# Patient Record
Sex: FEMALE | Race: WHITE | Employment: FULL TIME | URBAN - METROPOLITAN AREA
[De-identification: names, ages, dates, MRNs, and addresses within clinical notes are randomized per-mention and may not be internally consistent; named-entity substitution may affect disease eponyms.]

---
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
## 2019-05-06 ENCOUNTER — HOSPITAL ENCOUNTER (EMERGENCY)
Facility: HOSPITAL | Age: 19
Discharge: HOME | End: 2019-05-06
Attending: EMERGENCY MEDICINE
Payer: COMMERCIAL

## 2019-05-06 VITALS
WEIGHT: 147 LBS | HEART RATE: 72 BPM | RESPIRATION RATE: 16 BRPM | TEMPERATURE: 98 F | BODY MASS INDEX: 27.05 KG/M2 | SYSTOLIC BLOOD PRESSURE: 106 MMHG | DIASTOLIC BLOOD PRESSURE: 73 MMHG | HEIGHT: 62 IN | OXYGEN SATURATION: 100 %

## 2019-05-06 DIAGNOSIS — H66.90 ACUTE OTITIS MEDIA, UNSPECIFIED OTITIS MEDIA TYPE: Primary | ICD-10-CM

## 2019-05-06 PROCEDURE — 99283 EMERGENCY DEPT VISIT LOW MDM: CPT

## 2019-05-06 RX ORDER — IBUPROFEN 600 MG/1
600 TABLET ORAL ONCE
Status: COMPLETED | OUTPATIENT
Start: 2019-05-06 | End: 2019-05-06

## 2019-05-06 RX ORDER — AMOXICILLIN AND CLAVULANATE POTASSIUM 875; 125 MG/1; MG/1
1 TABLET, FILM COATED ORAL 2 TIMES DAILY
Qty: 20 TABLET | Refills: 0 | Status: SHIPPED | OUTPATIENT
Start: 2019-05-06 | End: 2019-05-16

## 2019-05-06 NOTE — ED PROVIDER NOTES
Patient Seen in: [facility] Emergency Department    History   Patient presents with:  Ear Problem Pain (neurosensory)  Headache (neurologic)    Stated Complaint:  ear pain    HPI    11-year-old female presents with right-sided ear pain.   She reports bilaterally. Sclerae anicteric. Conjunctivae show no pallor. Oropharynx clear, mucous membranes moist   Neck: supple, no rigidity. No palpable lymphadenopathy. Skin: no rashes or nodules    ED Course   Labs Reviewed - No data to display             MDM

## 2019-10-10 ENCOUNTER — OFFICE VISIT (OUTPATIENT)
Dept: FAMILY MEDICINE CLINIC | Facility: CLINIC | Age: 19
End: 2019-10-10
Payer: COMMERCIAL

## 2019-10-10 ENCOUNTER — LAB ENCOUNTER (OUTPATIENT)
Dept: LAB | Age: 19
End: 2019-10-10
Attending: FAMILY MEDICINE
Payer: COMMERCIAL

## 2019-10-10 VITALS
SYSTOLIC BLOOD PRESSURE: 102 MMHG | HEIGHT: 63 IN | HEART RATE: 80 BPM | BODY MASS INDEX: 26.4 KG/M2 | WEIGHT: 149 LBS | RESPIRATION RATE: 16 BRPM | DIASTOLIC BLOOD PRESSURE: 60 MMHG

## 2019-10-10 DIAGNOSIS — Z00.00 ROUTINE GENERAL MEDICAL EXAMINATION AT A HEALTH CARE FACILITY: Primary | ICD-10-CM

## 2019-10-10 DIAGNOSIS — Z00.00 ROUTINE GENERAL MEDICAL EXAMINATION AT A HEALTH CARE FACILITY: ICD-10-CM

## 2019-10-10 DIAGNOSIS — D64.9 ANEMIA, UNSPECIFIED TYPE: ICD-10-CM

## 2019-10-10 DIAGNOSIS — E04.9 ENLARGED THYROID: ICD-10-CM

## 2019-10-10 DIAGNOSIS — Z12.4 SCREENING FOR CERVICAL CANCER: ICD-10-CM

## 2019-10-10 DIAGNOSIS — Z11.3 SCREEN FOR STD (SEXUALLY TRANSMITTED DISEASE): ICD-10-CM

## 2019-10-10 DIAGNOSIS — Z23 NEED FOR VACCINATION: ICD-10-CM

## 2019-10-10 DIAGNOSIS — Z13.89 SCREENING FOR GENITOURINARY CONDITION: ICD-10-CM

## 2019-10-10 DIAGNOSIS — L30.9 DERMATITIS: ICD-10-CM

## 2019-10-10 PROCEDURE — 99385 PREV VISIT NEW AGE 18-39: CPT | Performed by: FAMILY MEDICINE

## 2019-10-10 PROCEDURE — 87591 N.GONORRHOEAE DNA AMP PROB: CPT | Performed by: FAMILY MEDICINE

## 2019-10-10 PROCEDURE — 82728 ASSAY OF FERRITIN: CPT

## 2019-10-10 PROCEDURE — 88175 CYTOPATH C/V AUTO FLUID REDO: CPT | Performed by: FAMILY MEDICINE

## 2019-10-10 PROCEDURE — 36415 COLL VENOUS BLD VENIPUNCTURE: CPT

## 2019-10-10 PROCEDURE — 84439 ASSAY OF FREE THYROXINE: CPT

## 2019-10-10 PROCEDURE — 90686 IIV4 VACC NO PRSV 0.5 ML IM: CPT | Performed by: FAMILY MEDICINE

## 2019-10-10 PROCEDURE — 90471 IMMUNIZATION ADMIN: CPT | Performed by: FAMILY MEDICINE

## 2019-10-10 PROCEDURE — 80053 COMPREHEN METABOLIC PANEL: CPT

## 2019-10-10 PROCEDURE — 84443 ASSAY THYROID STIM HORMONE: CPT

## 2019-10-10 PROCEDURE — 85025 COMPLETE CBC W/AUTO DIFF WBC: CPT

## 2019-10-10 PROCEDURE — 87491 CHLMYD TRACH DNA AMP PROBE: CPT | Performed by: FAMILY MEDICINE

## 2019-10-10 PROCEDURE — 80061 LIPID PANEL: CPT

## 2019-10-10 PROCEDURE — 83540 ASSAY OF IRON: CPT

## 2019-10-10 NOTE — PATIENT INSTRUCTIONS
Consider Gardasil (HPV vaccine). Human Papillomavirus Quadrivalent Vaccine suspension for injection  What is this medicine? HUMAN PAPILLOMAVIRUS VACCINE (HYOO muhn pap  ESEQUIEL h vahy gage davis SEEN) is a vaccine.  It is used to prevent infections of four site where injected  What may interact with this medicine? · other vaccines    What if I miss a dose? All 3 doses of the vaccine should be given within 6 months. Remember to keep appointments for follow-up doses.  Your health care provider will tell you w

## 2019-10-11 ENCOUNTER — TELEPHONE (OUTPATIENT)
Dept: FAMILY MEDICINE CLINIC | Facility: CLINIC | Age: 19
End: 2019-10-11

## 2019-10-11 DIAGNOSIS — D64.9 ANEMIA, UNSPECIFIED TYPE: Primary | ICD-10-CM

## 2019-10-11 RX ORDER — AZITHROMYCIN 1 G
PACKET (EA) ORAL
Qty: 1 EACH | Refills: 0 | Status: SHIPPED | OUTPATIENT
Start: 2019-10-11 | End: 2019-10-11

## 2019-10-11 RX ORDER — AZITHROMYCIN 500 MG/1
TABLET, FILM COATED ORAL
Qty: 2 TABLET | Refills: 0 | OUTPATIENT
Start: 2019-10-11 | End: 2019-11-07 | Stop reason: ALTCHOICE

## 2019-10-11 NOTE — TELEPHONE ENCOUNTER
Pt informed of GC/Chlamydia result done 10/10/2019 with recommendations and she voiced understanding. [facility] and appt 11/07/2019 for follow up.

## 2019-10-11 NOTE — TELEPHONE ENCOUNTER
Lm on vm stating that Azithromycin 1 g is not available so she will have to take 2 tabs once.   [facility]ed [facility].

## 2019-10-13 DIAGNOSIS — A74.9 CHLAMYDIA: Primary | ICD-10-CM

## 2019-10-13 RX ORDER — AZITHROMYCIN 500 MG/1
1000 TABLET, FILM COATED ORAL ONCE
Qty: 2 TABLET | Refills: 0 | Status: SHIPPED | OUTPATIENT
Start: 2019-10-13 | End: 2019-10-13

## 2019-10-13 NOTE — PROGRESS NOTES
Patient states prescription for azithromycin was not at her pharmacy. Prescription sent to [facility] in [facility].

## 2019-10-14 DIAGNOSIS — D50.9 IRON DEFICIENCY ANEMIA, UNSPECIFIED IRON DEFICIENCY ANEMIA TYPE: Primary | ICD-10-CM

## 2019-10-17 ENCOUNTER — HOSPITAL ENCOUNTER (OUTPATIENT)
Dept: ULTRASOUND IMAGING | Facility: HOSPITAL | Age: 19
Discharge: HOME | End: 2019-10-17
Attending: FAMILY MEDICINE
Payer: COMMERCIAL

## 2019-10-17 DIAGNOSIS — E04.9 ENLARGED THYROID: ICD-10-CM

## 2019-10-17 PROCEDURE — 76536 US EXAM OF HEAD AND NECK: CPT | Performed by: FAMILY MEDICINE

## 2019-10-18 ENCOUNTER — TELEPHONE (OUTPATIENT)
Dept: FAMILY MEDICINE CLINIC | Facility: CLINIC | Age: 19
End: 2019-10-18

## 2019-10-18 DIAGNOSIS — E04.9 ENLARGED THYROID: Primary | ICD-10-CM

## 2019-10-21 ENCOUNTER — TELEPHONE (OUTPATIENT)
Dept: FAMILY MEDICINE CLINIC | Facility: CLINIC | Age: 19
End: 2019-10-21

## 2019-11-07 ENCOUNTER — OFFICE VISIT (OUTPATIENT)
Dept: FAMILY MEDICINE CLINIC | Facility: CLINIC | Age: 19
End: 2019-11-07
Payer: COMMERCIAL

## 2019-11-07 VITALS
DIASTOLIC BLOOD PRESSURE: 64 MMHG | BODY MASS INDEX: 27.29 KG/M2 | SYSTOLIC BLOOD PRESSURE: 104 MMHG | HEART RATE: 80 BPM | WEIGHT: 154 LBS | RESPIRATION RATE: 16 BRPM | HEIGHT: 63 IN

## 2019-11-07 DIAGNOSIS — N89.8 VAGINAL ODOR: Primary | ICD-10-CM

## 2019-11-07 DIAGNOSIS — Z11.3 SCREENING FOR STDS (SEXUALLY TRANSMITTED DISEASES): ICD-10-CM

## 2019-11-07 PROCEDURE — 87660 TRICHOMONAS VAGIN DIR PROBE: CPT | Performed by: FAMILY MEDICINE

## 2019-11-07 PROCEDURE — 99213 OFFICE O/P EST LOW 20 MIN: CPT | Performed by: FAMILY MEDICINE

## 2019-11-07 PROCEDURE — 87510 GARDNER VAG DNA DIR PROBE: CPT | Performed by: FAMILY MEDICINE

## 2019-11-07 PROCEDURE — 87480 CANDIDA DNA DIR PROBE: CPT | Performed by: FAMILY MEDICINE

## 2019-11-07 NOTE — PROGRESS NOTES
Radha Ramos is a 20year old female. CHIEF COMPLAINT   Follow up to discuss STD testing  HPI:   White, slightly clumpy discharge noted after antibiotic for chlamydia. Sometimes light yellow. And different odor since after antibiotic.  No symptoms

## 2019-11-08 DIAGNOSIS — Z11.3 SCREEN FOR STD (SEXUALLY TRANSMITTED DISEASE): ICD-10-CM

## 2019-11-08 DIAGNOSIS — Z86.19 HISTORY OF CHLAMYDIA: ICD-10-CM

## 2019-11-08 DIAGNOSIS — N89.8 VAGINAL ODOR: Primary | ICD-10-CM

## 2019-11-09 PROBLEM — E04.9 ENLARGED THYROID: Status: ACTIVE | Noted: 2019-11-09

## 2019-11-14 ENCOUNTER — OFFICE VISIT (OUTPATIENT)
Dept: FAMILY MEDICINE CLINIC | Facility: CLINIC | Age: 19
End: 2019-11-14
Payer: COMMERCIAL

## 2019-11-14 VITALS
HEIGHT: 63 IN | HEART RATE: 80 BPM | SYSTOLIC BLOOD PRESSURE: 104 MMHG | BODY MASS INDEX: 28 KG/M2 | WEIGHT: 158 LBS | DIASTOLIC BLOOD PRESSURE: 72 MMHG | RESPIRATION RATE: 16 BRPM

## 2019-11-14 DIAGNOSIS — Z86.19 HISTORY OF CHLAMYDIA: ICD-10-CM

## 2019-11-14 DIAGNOSIS — N89.8 VAGINAL ODOR: Primary | ICD-10-CM

## 2019-11-14 PROCEDURE — 87140 CULTURE TYPE IMMUNOFLUORESC: CPT | Performed by: FAMILY MEDICINE

## 2019-11-14 PROCEDURE — 87110 CHLAMYDIA CULTURE: CPT | Performed by: FAMILY MEDICINE

## 2019-11-14 PROCEDURE — 99212 OFFICE O/P EST SF 10 MIN: CPT | Performed by: FAMILY MEDICINE

## 2019-11-14 NOTE — PROGRESS NOTES
Gregorio Francis is a 20year old female. CHIEF COMPLAINT   Vaginal odor  HPI:   No fever. No pelvic pain. Still with odor. Discharge seems better. Patient and partner were both treated at the same time for chlamydia.  Patient didn't note any vaginal o

## 2019-11-17 ENCOUNTER — TELEPHONE (OUTPATIENT)
Dept: FAMILY MEDICINE CLINIC | Facility: CLINIC | Age: 19
End: 2019-11-17

## 2019-11-18 NOTE — TELEPHONE ENCOUNTER
Call to Christal Heredia w question noted below from [address] this is not the chlamydia test usually ordered-which is the Aptima GC/cCllamydia amplification by PCR which is done at [place]  The ordered test is a sendout test to [facility] so was

## 2019-12-11 ENCOUNTER — HOSPITAL ENCOUNTER (EMERGENCY)
Facility: HOSPITAL | Age: 19
Discharge: HOME | End: 2019-12-12
Attending: EMERGENCY MEDICINE
Payer: COMMERCIAL

## 2019-12-11 VITALS
WEIGHT: 156.5 LBS | HEART RATE: 68 BPM | SYSTOLIC BLOOD PRESSURE: 120 MMHG | BODY MASS INDEX: 28 KG/M2 | TEMPERATURE: 98 F | OXYGEN SATURATION: 99 % | DIASTOLIC BLOOD PRESSURE: 77 MMHG | RESPIRATION RATE: 16 BRPM

## 2019-12-11 DIAGNOSIS — S61.309A NAIL AVULSION, FINGER, INITIAL ENCOUNTER: Primary | ICD-10-CM

## 2019-12-11 PROCEDURE — 99282 EMERGENCY DEPT VISIT SF MDM: CPT

## 2019-12-12 NOTE — ED PROVIDER NOTES
Patient Seen in: [facility] Emergency Department      History   Patient presents with:  Upper Extremity Injury    Stated Complaint: finger injury    HPI    79-year-old female presents emergency room with chief complaint of left third finger injury. all digits. SKIN: Warm, dry, intact, no rashes to the left hand. NEUROLOGIC EXAM: Sensation intact all digits.     ED Course   Labs Reviewed - No data to display         The patient's splint was checked after placement and was noted to have good placement

## 2020-02-24 ENCOUNTER — TELEPHONE (OUTPATIENT)
Dept: FAMILY MEDICINE CLINIC | Facility: CLINIC | Age: 20
End: 2020-02-24

## 2020-02-24 NOTE — TELEPHONE ENCOUNTER
She would need appointment to discuss first since there are side effects with medication we need to discuss prior to initiating. Also I believe our protocol states that the first depo needs to be done while on menses?   She should use condoms in the [employer]

## 2020-02-24 NOTE — TELEPHONE ENCOUNTER
Pt called. She would like to see about starting Depo provera. The first day of her  LMP was 01/28/2020. She is leaving for [place] Friday and would like to start this on Wednesday or Thursday.  I informed her that she will probably need an appt to discuss c

## 2020-02-24 NOTE — TELEPHONE ENCOUNTER
I spoke with patient regarding starting depo. She was only wanting to try this so she would not have her menses when she is in [place]. If she decides she want to pursue this when she returns from [place] she will call back.

## 2020-03-27 ENCOUNTER — OFFICE VISIT (OUTPATIENT)
Dept: FAMILY MEDICINE CLINIC | Facility: CLINIC | Age: 20
End: 2020-03-27
Payer: COMMERCIAL

## 2020-03-27 VITALS
RESPIRATION RATE: 18 BRPM | TEMPERATURE: 97 F | SYSTOLIC BLOOD PRESSURE: 110 MMHG | BODY MASS INDEX: 29.41 KG/M2 | HEART RATE: 80 BPM | HEIGHT: 63 IN | WEIGHT: 166 LBS | DIASTOLIC BLOOD PRESSURE: 70 MMHG

## 2020-03-27 DIAGNOSIS — R10.2 SUPRAPUBIC PAIN: Primary | ICD-10-CM

## 2020-03-27 DIAGNOSIS — R10.11 RIGHT UPPER QUADRANT PAIN: ICD-10-CM

## 2020-03-27 LAB
APPEARANCE: CLEAR
MULTISTIX LOT#: NORMAL NUMERIC
PH, URINE: 7 (ref 4.5–8)
SPECIFIC GRAVITY: 1.01 (ref 1–1.03)
URINE-COLOR: YELLOW
UROBILINOGEN,SEMI-QN: 0.2 MG/DL (ref 0–1.9)

## 2020-03-27 PROCEDURE — 81003 URINALYSIS AUTO W/O SCOPE: CPT | Performed by: FAMILY MEDICINE

## 2020-03-27 PROCEDURE — 99214 OFFICE O/P EST MOD 30 MIN: CPT | Performed by: FAMILY MEDICINE

## 2020-03-27 NOTE — PROGRESS NOTES
Alvarez  is a 20year old female. CHIEF COMPLAINT   Pain in bladder area  HPI:   Cough for the last 3-4 nights. Feels a tickle in throat that makes her cough. Nose feels dry and eyes feel dry. No fever.  Travel to [place] - returned on March 5t No guarding. No organomegaly. No lower abdominal tenderness. :  No external lesions. Cervix pink - no discharge. No cervical motion tenderness. No adnexal tenderness or masses.     EXTREMITIES: no cyanosis, clubbing or edema    ASSESSMENT AND PLAN:

## 2020-04-17 ENCOUNTER — TELEPHONE (OUTPATIENT)
Dept: FAMILY MEDICINE CLINIC | Facility: CLINIC | Age: 20
End: 2020-04-17

## 2020-04-17 ENCOUNTER — VIRTUAL PHONE E/M (OUTPATIENT)
Dept: FAMILY MEDICINE CLINIC | Facility: CLINIC | Age: 20
End: 2020-04-17

## 2020-04-17 DIAGNOSIS — R43.0 LOSS OF SENSE OF SMELL: ICD-10-CM

## 2020-04-17 DIAGNOSIS — R11.2 NON-INTRACTABLE VOMITING WITH NAUSEA, UNSPECIFIED VOMITING TYPE: ICD-10-CM

## 2020-04-17 DIAGNOSIS — R19.5 LOOSE STOOLS: ICD-10-CM

## 2020-04-17 DIAGNOSIS — R68.83 CHILLS: ICD-10-CM

## 2020-04-17 DIAGNOSIS — R11.0 NAUSEA: Primary | ICD-10-CM

## 2020-04-17 PROCEDURE — 99442 PHONE E/M BY PHYS 11-20 MIN: CPT | Performed by: FAMILY MEDICINE

## 2020-04-17 RX ORDER — ONDANSETRON 4 MG/1
TABLET, ORALLY DISINTEGRATING ORAL
Qty: 12 TABLET | Refills: 0 | Status: SHIPPED | OUTPATIENT
Start: 2020-04-17 | End: 2020-07-27

## 2020-04-17 NOTE — TELEPHONE ENCOUNTER
Phone visit completed with patient on 4/17/20. Doesn't meet criteria for Covid testing but is PUI based on symptoms. Brother tested negative so no known exposure. Please update Monday.

## 2020-04-17 NOTE — TELEPHONE ENCOUNTER
What symptoms is the patient experiencing?:  Hot not sure if she has temp, chills, threw up, cough, loss of appetite, loss of smell, headache    Has the patient traveled to an effected geographic area ([place], [place], [facility], [facility], [place], [place])?  - IF

## 2020-04-17 NOTE — TELEPHONE ENCOUNTER
Call to pt-sts \"started a diet tea 4/14-took one that day and once 4/15-none since. 4/15 had no appetite, felt hot all over-don't have a thermometer so fever unknown.  Yesterday again no appetite, hot all over again, chills, vomited x1, headache ranging 5-

## 2020-04-17 NOTE — TELEPHONE ENCOUNTER
Please schedule telephone visit for 4:15. As soon as she is scheduled, I will call her. Please consent her for visit (insurance will be billed for telephone encounter). Send me a message when scheduled. Thanks.

## 2020-04-17 NOTE — PROGRESS NOTES
Virtual Telephone Check-In    Shira Hawley verbally consents to a Virtual/Telephone Check-In visit on 04/17/20. Patient understands and accepts financial responsibility for any deductible, co-insurance and/or co-pays associated with this service.

## 2020-04-22 NOTE — TELEPHONE ENCOUNTER
Call to pt's cell for COVID PUI follow up reaches identified voice mail. Left vmm req call back to triage nurse today to provide condition update. Provided ofc phone hours.

## 2020-04-23 ENCOUNTER — TELEPHONE (OUTPATIENT)
Dept: FAMILY MEDICINE CLINIC | Facility: CLINIC | Age: 20
End: 2020-04-23

## 2020-04-23 NOTE — TELEPHONE ENCOUNTER
Pt said she is feeling better, no headaches. Pt said she still has not regain her sense of smell and taste. However, her appetite has returned.  We will call pt again for update 4/27/2020

## 2020-04-23 NOTE — TELEPHONE ENCOUNTER
Call to pt's cell for COVID PUI follow up reaches identified voice mail. Left vmm req call back to triage nurse today to provide condition update. Provided ofc phone hours.    Per hipaa consent-call to uri/father [phone] he is listed as a con

## 2020-04-23 NOTE — TELEPHONE ENCOUNTER
Pt said she is feeling better, no headaches. Pt said she still has not regain her sense of smell and taste. However, her appetite has returned.

## 2020-05-01 NOTE — TELEPHONE ENCOUNTER
Pt called back to give a condition update. Calin PUENTES-19 PUI. • How are you feeling? \"I feel fine. \"   • Running any fever? \" No, I have not had a fever for greater than 72 hours. And I am not taking any tylenol or OTC medication. \"   • Any continuing c

## 2020-05-15 ENCOUNTER — MOBILE ENCOUNTER (OUTPATIENT)
Dept: FAMILY MEDICINE CLINIC | Facility: CLINIC | Age: 20
End: 2020-05-15

## 2020-05-15 ENCOUNTER — TELEPHONE (OUTPATIENT)
Dept: FAMILY MEDICINE CLINIC | Facility: CLINIC | Age: 20
End: 2020-05-15

## 2020-05-15 ENCOUNTER — VIRTUAL PHONE E/M (OUTPATIENT)
Dept: FAMILY MEDICINE CLINIC | Facility: CLINIC | Age: 20
End: 2020-05-15
Payer: COMMERCIAL

## 2020-05-15 DIAGNOSIS — R05.9 COUGH: Primary | ICD-10-CM

## 2020-05-15 DIAGNOSIS — R50.9 FEVER, UNSPECIFIED FEVER CAUSE: ICD-10-CM

## 2020-05-15 DIAGNOSIS — R51.9 ACUTE NONINTRACTABLE HEADACHE, UNSPECIFIED HEADACHE TYPE: ICD-10-CM

## 2020-05-15 DIAGNOSIS — R09.81 NASAL CONGESTION: ICD-10-CM

## 2020-05-15 DIAGNOSIS — R43.0 LOSS OF SMELL: ICD-10-CM

## 2020-05-15 PROCEDURE — 99442 PHONE E/M BY PHYS 11-20 MIN: CPT | Performed by: FAMILY MEDICINE

## 2020-05-15 NOTE — PROGRESS NOTES
Virtual Telephone Check-In    Riverlui Burger verbally consents to a Virtual/Telephone Check-In visit on 05/15/20. Patient understands and accepts financial responsibility for any deductible, co-insurance and/or co-pays associated with this service.

## 2020-05-15 NOTE — TELEPHONE ENCOUNTER
1. What are your symptoms? Sore throat, dry cough, Fever, dizzy, headaches, eyes hurt, no sense of smell, constipated, leg muscles achy, fatigued    2. How long have you been having these symptoms? 3-4 days    3.  Have you done anything already to jose armando

## 2020-05-16 ENCOUNTER — LAB ENCOUNTER (OUTPATIENT)
Dept: LAB | Facility: HOSPITAL | Age: 20
End: 2020-05-16
Attending: FAMILY MEDICINE
Payer: COMMERCIAL

## 2020-05-16 ENCOUNTER — MOBILE ENCOUNTER (OUTPATIENT)
Dept: FAMILY MEDICINE CLINIC | Facility: CLINIC | Age: 20
End: 2020-05-16

## 2020-05-16 DIAGNOSIS — R05.9 COUGH: ICD-10-CM

## 2020-05-16 DIAGNOSIS — R50.9 FEVER, UNSPECIFIED FEVER CAUSE: ICD-10-CM

## 2020-05-16 DIAGNOSIS — R51.9 ACUTE NONINTRACTABLE HEADACHE, UNSPECIFIED HEADACHE TYPE: ICD-10-CM

## 2020-05-16 DIAGNOSIS — R43.0 LOSS OF SMELL: ICD-10-CM

## 2020-05-16 DIAGNOSIS — R09.81 NASAL CONGESTION: ICD-10-CM

## 2020-05-18 ENCOUNTER — TELEPHONE (OUTPATIENT)
Dept: CASE MANAGEMENT | Age: 20
End: 2020-05-18

## 2020-05-18 ENCOUNTER — PATIENT OUTREACH (OUTPATIENT)
Dept: CASE MANAGEMENT | Age: 20
End: 2020-05-18

## 2020-05-18 NOTE — PROGRESS NOTES
Home Monitoring Condition Update    Covid19+ test date: 5/16/2020      Consent Verification:  Assessment Completed With: Patient  HIPAA Verified?   Yes    COVID-19 HOME MONITORING 5/18/2020   Temperature (No Data)   Pulse 82   Pulse taken from Manually thermometer if we have resources. Aware that individual can not have tested Covid + or be symptomatic. Discussed trying bland diet w small/frequent meals and good hydration-explained rationales.  Explained MyChart  program-pt voices interest. Christen Duncan

## 2020-05-19 ENCOUNTER — PATIENT OUTREACH (OUTPATIENT)
Dept: CASE MANAGEMENT | Age: 20
End: 2020-05-19

## 2020-05-19 DIAGNOSIS — U07.1 COVID-19: ICD-10-CM

## 2020-05-19 PROCEDURE — A4931 REUSABLE ORAL THERMOMETER: HCPCS

## 2020-05-20 ENCOUNTER — PATIENT OUTREACH (OUTPATIENT)
Dept: CASE MANAGEMENT | Age: 20
End: 2020-05-20

## 2020-05-20 NOTE — PROGRESS NOTES
Spoke with patient ( verified) for day #2 home monitoring--she reports the only person who is not Covid+ is her dad--he works until 4 p.m.     Patient asking if a Covid + person can  thermometer--relayed to patient it needs to be someone who is no

## 2020-05-20 NOTE — TELEPHONE ENCOUNTER
See 5/19 TE from Joo Desouza T-thermometer mailed
Spoke to pt for day 1 home monitoring. sts has no thermometer-tried to obtain but none available. sts does have someone-not symptomatic or covid+-that could  if available. Please assist as able.
activity change, appetite change, chills, diaphoresis, fatigue and fever. HENT: Positive for congestion and hearing loss. Negative for dental problem, drooling and ear discharge. Eyes: Negative for pain, discharge, redness and itching. Respiratory: Positive for cough. Negative for apnea, choking, chest tightness and shortness of breath. Cardiovascular: Positive for leg swelling. Negative for chest pain. Gastrointestinal: Negative for abdominal distention, abdominal pain, blood in stool, constipation and diarrhea. Endocrine: Negative for cold intolerance and heat intolerance. Genitourinary: Negative for difficulty urinating, dysuria, enuresis, flank pain and frequency. Musculoskeletal: Positive for arthralgias, back pain and gait problem. Negative for joint swelling. Skin: Negative for color change, pallor and rash. Neurological: Negative for dizziness, facial asymmetry, light-headedness, numbness and headaches. Psychiatric/Behavioral: Negative for agitation, behavioral problems, confusion, decreased concentration and dysphoric mood. Objective:   Physical Exam   Constitutional: She is oriented to person, place, and time. She appears well-developed and well-nourished. HENT:   Head: Normocephalic and atraumatic. Mouth/Throat: No oropharyngeal exudate. Hard of Hearing    Eyes: Conjunctivae are normal. Pupils are equal, round, and reactive to light. Right eye exhibits no discharge. Left eye exhibits no discharge. No scleral icterus. Neck: Normal range of motion. Neck supple. No JVD present. No tracheal deviation present. No thyromegaly present. Cardiovascular: Normal rate and normal heart sounds. Exam reveals no gallop. No murmur heard. Pulmonary/Chest: Effort normal and breath sounds normal. No stridor. No respiratory distress. She has no wheezes. She has no rales. She exhibits no tenderness. Abdominal: Soft. Bowel sounds are normal. She exhibits no distension.  There is no

## 2020-05-20 NOTE — PROGRESS NOTES
Home Monitoring Condition Update    Covid19+ test date: 5/16/2020      Consent Verification:  Assessment Completed With: Patient  HIPAA Verified?   Yes    COVID-19 HOME MONITORING 5/20/2020   Temperature (No Data)   Pulse (No Data)   Pulse taken from [facility] and stay completely isolated from the general public 3 days after symptoms resolve or 10 days total (whichever is longer). Advised patient If symptoms worsen/ medical emergency to call 911.       Time spent this encounter reviewing chart, speaking with dione

## 2020-05-21 ENCOUNTER — PATIENT OUTREACH (OUTPATIENT)
Dept: CASE MANAGEMENT | Age: 20
End: 2020-05-21

## 2020-05-21 NOTE — PROGRESS NOTES
LMOMTCB for home monitoring day #3 condition update. NCM contact information provided.       VV previously scheduled for 5/22/2020

## 2020-05-22 ENCOUNTER — VIRTUAL PHONE E/M (OUTPATIENT)
Dept: FAMILY MEDICINE CLINIC | Facility: CLINIC | Age: 20
End: 2020-05-22
Payer: COMMERCIAL

## 2020-05-22 DIAGNOSIS — U07.1 COVID-19: Primary | ICD-10-CM

## 2020-05-22 PROCEDURE — 99442 PHONE E/M BY PHYS 11-20 MIN: CPT | Performed by: FAMILY MEDICINE

## 2020-05-22 RX ORDER — BENZONATATE 200 MG/1
CAPSULE ORAL
Qty: 30 CAPSULE | Refills: 0 | Status: SHIPPED | OUTPATIENT
Start: 2020-05-22 | End: 2021-02-04 | Stop reason: ALTCHOICE

## 2020-05-22 NOTE — PROGRESS NOTES
Virtual Telephone Check-In    Nehal  verbally consents to a Virtual/Telephone Check-In visit on 05/22/20. Patient has been referred to the [facility] website at [email or web address] to review the yearly Consent to Treat document.     Patient unde

## 2020-05-26 ENCOUNTER — PATIENT OUTREACH (OUTPATIENT)
Dept: CASE MANAGEMENT | Age: 20
End: 2020-05-26

## 2020-05-26 NOTE — PROGRESS NOTES
Home Monitoring Condition Update    Covid19+ test date: 5/16/2020      Consent Verification:  Assessment Completed With: Patient  HIPAA Verified?   Yes    COVID-19 HOME MONITORING 5/26/2020   Temperature 98.4   Reading From Mouth   Pulse 80   Pulse taken will be calling daily over the next few days to give a condition update to PCP--patient verbalizes understanding and agreement.     Patient advised to continue monitoring temperature and pulse at least twice a day and record, rest, stay hydrated, BRAT diet

## 2020-05-26 NOTE — PROGRESS NOTES
VEROTCINGRID for home monitoring day #3 condition update. NCM contact information provided.       Patient had VV 5/22/2020--Rx for benzonatate given:        Meds & Refills for this Visit:  Requested Prescriptions             Signed Prescriptions Disp Refills

## 2020-05-27 ENCOUNTER — PATIENT OUTREACH (OUTPATIENT)
Dept: CASE MANAGEMENT | Age: 20
End: 2020-05-27

## 2020-05-27 ENCOUNTER — TELEPHONE (OUTPATIENT)
Dept: CASE MANAGEMENT | Age: 20
End: 2020-05-27

## 2020-05-27 RX ORDER — ALBUTEROL SULFATE 90 UG/1
2 AEROSOL, METERED RESPIRATORY (INHALATION) EVERY 6 HOURS PRN
Qty: 1 INHALER | Refills: 0 | Status: SHIPPED | OUTPATIENT
Start: 2020-05-27 | End: 2021-02-04 | Stop reason: ALTCHOICE

## 2020-05-27 NOTE — TELEPHONE ENCOUNTER
Called patient for day 5 COVID home monitoring    Patient still has complaints of chest pain. Located in the middle of chest. Patient rates pain 7/10. Denies SOB. Describes as sharp. Cough improved today. Coughing makes pain worse.  Massaging the area seem

## 2020-05-27 NOTE — TELEPHONE ENCOUNTER
Let's try albuterol HFA 2 puffs every 4-6 hours prn. Tylenol for pain (will avoid NSAIDs for now since Covid positive). Update tomorrow. To ER if worsening symptoms.

## 2020-05-27 NOTE — TELEPHONE ENCOUNTER
DHRUVI  Patient advised and verbalized understanding. Rx sent to patients pharmacy per written order.

## 2020-05-28 ENCOUNTER — PATIENT OUTREACH (OUTPATIENT)
Dept: CASE MANAGEMENT | Age: 20
End: 2020-05-28

## 2020-05-28 NOTE — TELEPHONE ENCOUNTER
Spoke with patient for day #6 Covid Home Monitoring (test date 5/16/2020):    Patient reports her chest pain is, Mabeline Has lot better today with the Albuterol. My chest pain is about a 2/10 right now.  I feel better when I sit up or walk around when I get the lauro

## 2020-05-28 NOTE — PROGRESS NOTES
Home Monitoring Condition Update    Covid19+ test date: 5/16/2020      Consent Verification:  Assessment Completed With: Patient  HIPAA Verified?   Yes    COVID-19 HOME MONITORING 5/28/2020   Temperature 97.7   Reading From Mouth   Pulse 68   Pulse taken to continue monitoring temperature and pulse at least twice a day and record, rest, stay hydrated, BRAT diet with small, frequent meals.  Advised to take Tylenol q 6 hrs prn fever/pain, not to exceed 3000 mg/day--to call back with worsening symptoms--blake

## 2020-05-29 ENCOUNTER — PATIENT OUTREACH (OUTPATIENT)
Dept: CASE MANAGEMENT | Age: 20
End: 2020-05-29

## 2020-05-29 NOTE — PROGRESS NOTES
Home Monitoring Condition Update    Covid19+ test date: 5/16/20  Contact date: 5/29/20      Consent Verification:  Assessment Completed With: Patient  HIPAA Verified?   Yes    COVID-19 HOME MONITORING 5/29/2020   Temperature 98   Reading From Mouth   Puls last night. No longer having chest pain, SOB, or cough. Patient using albuterol inhaler as needed and doing deep breathing exercises. Does get relief from doing that.   Future Appointments   Date Time Provider Johanna Del Valle   6/1/2020  2:00 PM [place],

## 2020-05-29 NOTE — PROGRESS NOTES
LMOMTCB for home monitoring day #7 condition update. NCM contact information provided.       F/U chest pain/cough--VV on 6/01/2020

## 2020-06-01 ENCOUNTER — VIRTUAL PHONE E/M (OUTPATIENT)
Dept: FAMILY MEDICINE CLINIC | Facility: CLINIC | Age: 20
End: 2020-06-01
Payer: COMMERCIAL

## 2020-06-01 DIAGNOSIS — U07.1 COVID-19: Primary | ICD-10-CM

## 2020-06-01 PROBLEM — Z86.16 HISTORY OF 2019 NOVEL CORONAVIRUS DISEASE (COVID-19): Status: ACTIVE | Noted: 2020-06-01

## 2020-06-01 PROCEDURE — 99213 OFFICE O/P EST LOW 20 MIN: CPT | Performed by: FAMILY MEDICINE

## 2020-06-01 NOTE — PROGRESS NOTES
Virtual Telephone Check-In    Jomar Minors verbally consents to a Virtual/Telephone Check-In visit on 06/01/20. Patient has been referred to the [facility] website at [email or web address] to review the yearly Consent to Treat document.     Patient unde

## 2020-06-02 ENCOUNTER — PATIENT OUTREACH (OUTPATIENT)
Dept: CASE MANAGEMENT | Age: 20
End: 2020-06-02

## 2020-06-02 ENCOUNTER — TELEPHONE (OUTPATIENT)
Dept: FAMILY MEDICINE CLINIC | Facility: CLINIC | Age: 20
End: 2020-06-02

## 2020-06-02 NOTE — TELEPHONE ENCOUNTER
Pt tested positive for covid and spoke to [place] yesterday about when she could go back to work. Pt states that she is having diarrhea and the cough is bothering her.     Pt is wondering if there is anything that could be sent in for her to help with

## 2020-06-02 NOTE — TELEPHONE ENCOUNTER
I called and spoke with pt. I informed her JACQUELYN Lazcano advised her to try OTC imodium for diarrhea. Use the albuterol inhaler and the Benzonatate for cough and if that is not working try OTC cough med. Monitor temp. If any fever call and update JACQUELYN Lazcano

## 2020-06-02 NOTE — PROGRESS NOTES
Per PCP note 6/2, patient is cleared to return to work 6/1 and letter was provided via [address]  Removing from program.

## 2020-06-02 NOTE — TELEPHONE ENCOUNTER
Please triage. How much diarrhea? Yesterday she said her cough is better so please ask about this. If she is feeling worse again, she should not return to work.

## 2020-06-02 NOTE — PROGRESS NOTES
Humberto Lucas PA-C  P [facility] Home Monitoring             Patient may discontinue home monitoring

## 2020-06-02 NOTE — TELEPHONE ENCOUNTER
Pt called back. Reports diarrhea started yesterday. Reports 5-6 x yesterday-watery/soft. No blood. Today only x 1. No vomiting.   Denies nausea but sts she feels \"stomach is turning, was like that all day yesterday and today\"  Reports she felt warm ye

## 2020-06-02 NOTE — PROGRESS NOTES
Patient had virtual visit yesterday for Covid follow-up (test date 5/15/2020):    Please advise if patient can be removed from home monitoring program or if needs continued monitoring.      If continued monitoring is needed, please specify frequency, durati

## 2020-06-02 NOTE — TELEPHONE ENCOUNTER
Ok to try some OTC imodium for diarrhea. She can use benzonatate and albuterol to help with cough. If the benzonatate isn't working, she can try an OTC cough med to help. She should stay off work for now and update in 2 days. Needs to check temp also.

## 2020-06-04 RX ORDER — FAMOTIDINE 20 MG/1
20 TABLET ORAL 2 TIMES DAILY
Qty: 20 TABLET | Refills: 0 | Status: SHIPPED | OUTPATIENT
Start: 2020-06-04 | End: 2021-02-04 | Stop reason: ALTCHOICE

## 2020-06-04 NOTE — TELEPHONE ENCOUNTER
Lets have her take famotidine 20mg BID #20. This will help block any acid that may be contributing to her symptoms. Update again tomorrow.

## 2020-06-04 NOTE — TELEPHONE ENCOUNTER
Pt was notified to take famotidine 20 mg 1 po bid for 10 days. Pt notified we will call her again tomorrow for an update. Pt verbalized understanding.

## 2020-06-04 NOTE — TELEPHONE ENCOUNTER
Pt states she has stomach pain \"like hunger pains\",discussed BRAT diet, clear liquids small amounts,has diarrhea yesterday watery x one, today had soft yellow stool, had coughing episode yesterday that made her feel tight, with some chest pain, today no

## 2020-06-05 NOTE — TELEPHONE ENCOUNTER
Pt is asking for a letter for work stating she should not go back to work right now. Per pt, something along these lines: This patient is still having COVID-19 symptoms.   She is taking medicine for three to four days and will we reassess her at th

## 2020-06-05 NOTE — TELEPHONE ENCOUNTER
What does she want the letter to say? She shouldn't return to work yet if still having diarrhea. We can update again on Monday.

## 2020-06-05 NOTE — TELEPHONE ENCOUNTER
See note below, pt states she is feeling better,denies fever, pt states she had 3 episodes of coughing spells today, pt states she had 3 watery stools today. Continuing on [employer], wants a letter to go to [employer] so she can send to her work.

## 2020-06-08 ENCOUNTER — TELEPHONE (OUTPATIENT)
Dept: FAMILY MEDICINE CLINIC | Facility: CLINIC | Age: 20
End: 2020-06-08

## 2020-06-08 NOTE — TELEPHONE ENCOUNTER
When exactly did the diarrhea start? When did she start the  Lemon alex/water/apple cider vinegar combo? I would recommend she stop this for tomorrow then update. We should make sure her diarrhea has resolved before she returns to work.

## 2020-06-08 NOTE — TELEPHONE ENCOUNTER
Pt called wanting to know when she can make an appointment for a final eval before she goes back to work. She is feeling better as far as the cough goes. She now thinks her problems with her stool is what she believes is related to her diet.  She drinks lilia

## 2020-06-09 NOTE — TELEPHONE ENCOUNTER
Pt states she will be returning to work on Fri, she will find a note in [employer] today, voices understanding.

## 2020-06-09 NOTE — TELEPHONE ENCOUNTER
Pt states she has been drinking combo drink on and off x one year but most recently started last week,had one drink last week and then had only 2 drinks from Saturday til yesterday, diarrhea started 6/4/2020 watery green daily but yesterday had yellow wate

## 2020-06-09 NOTE — TELEPHONE ENCOUNTER
Ok to write a note to return to work if no fever, no diarrhea, no vomiting, cough almost resolved. Should continue to wear a mask at work, in public per public health guidelines.

## 2020-06-09 NOTE — TELEPHONE ENCOUNTER
See note below, pt called back stating she just had a green formed stool, that was hard and was straining to have BM

## 2020-07-27 ENCOUNTER — APPOINTMENT (OUTPATIENT)
Dept: GENERAL RADIOLOGY | Age: 20
End: 2020-07-27
Attending: PHYSICIAN ASSISTANT
Payer: COMMERCIAL

## 2020-07-27 ENCOUNTER — HOSPITAL ENCOUNTER (OUTPATIENT)
Age: 20
Discharge: HOME | End: 2020-07-27
Payer: COMMERCIAL

## 2020-07-27 ENCOUNTER — PATIENT MESSAGE (OUTPATIENT)
Dept: FAMILY MEDICINE CLINIC | Facility: CLINIC | Age: 20
End: 2020-07-27

## 2020-07-27 VITALS
DIASTOLIC BLOOD PRESSURE: 73 MMHG | OXYGEN SATURATION: 98 % | SYSTOLIC BLOOD PRESSURE: 113 MMHG | BODY MASS INDEX: 26.58 KG/M2 | HEART RATE: 103 BPM | HEIGHT: 63 IN | TEMPERATURE: 98 F | WEIGHT: 150 LBS | RESPIRATION RATE: 16 BRPM

## 2020-07-27 DIAGNOSIS — S93.402A MODERATE LEFT ANKLE SPRAIN, INITIAL ENCOUNTER: ICD-10-CM

## 2020-07-27 DIAGNOSIS — M25.572 ACUTE LEFT ANKLE PAIN: Primary | ICD-10-CM

## 2020-07-27 PROCEDURE — 73610 X-RAY EXAM OF ANKLE: CPT | Performed by: PHYSICIAN ASSISTANT

## 2020-07-27 PROCEDURE — 99214 OFFICE O/P EST MOD 30 MIN: CPT | Performed by: PHYSICIAN ASSISTANT

## 2020-07-27 NOTE — TELEPHONE ENCOUNTER
Spoke with Anna Smith about what to tell pt, as no providers had any openings today, she advised that pt should be sent to urgent care. Called to pt, she stated that she is currently at an immediate care.   Reminded her about the 48-72 hour turn ar

## 2020-07-27 NOTE — ED INITIAL ASSESSMENT (HPI)
Pt c/o yesterday morning, climbing up stairs while carrying groceries, landed wrong and twisted left ankle. Pt reports it is painful to bear weight. Swelling noted to left ankle, no obvious deformity. Good cap refill.

## 2020-07-27 NOTE — ED PROVIDER NOTES
Patient Seen in: [facility]      History   Patient presents with:   Ankle Injury    Stated Complaint: Left ankle pain    HPI    HPI: Left ankle injury     CC:  Patient presents today with the chief complaint of left ankle pa LMP 07/20/2020 (Exact Date)   SpO2 98%   BMI 26.57 kg/m²         Physical Exam    General Appearance: Pt patient is alert and oriented ×4, appears in no sig distress    Ext: Left ankle appears swollen over the lateral malleolous.  There is tenderness to pa emergency medical condition was not or was no longer present. There was no indication for further evaluation, treatment or admission on an emergency basis.   Comprehensive verbal and written discharge and follow-up instructions were provided to help preven

## 2020-07-27 NOTE — TELEPHONE ENCOUNTER
From: Nupur Davis  To: Ronit Barr PA-C  Sent: 7/27/2020 7:55 AM CDT  Subject: Other    Hello there, I twisted my ankle on Saturday night and it was swollen all day yesterday and I can't walk on it without feeling a lot of pain.  It turned gr

## 2020-07-31 ENCOUNTER — OFFICE VISIT (OUTPATIENT)
Dept: ORTHOPEDICS CLINIC | Facility: CLINIC | Age: 20
End: 2020-07-31
Payer: COMMERCIAL

## 2020-07-31 VITALS — HEART RATE: 80 BPM

## 2020-07-31 DIAGNOSIS — S93.402A SPRAIN OF LEFT ANKLE, UNSPECIFIED LIGAMENT, INITIAL ENCOUNTER: Primary | ICD-10-CM

## 2020-07-31 PROCEDURE — 99203 OFFICE O/P NEW LOW 30 MIN: CPT | Performed by: ORTHOPAEDIC SURGERY

## 2020-07-31 NOTE — H&P
[facility] New Patient Note    CC: Patient presents with: Ankle Pain: c/o L ankle sprain since saturday night       HPI: This 20year old female presents today with complaints of left ankle sprain.   Patient reports she rolled the ankle inward 1 week ROS:  Detailed system review obtained and negative except as mentioned above      Physical Exam:    Pulse 80   LMP 07/20/2020 (Exact Date)   Constitutional: Awake, alert, no distress. Psychological: Appropriate affect.   Respiratory: Unlabored breathin She is going to attempt to use this if needed while at work. She is asking for return to work note without restrictions, this was provided to her today. She will call us for follow-up as needed.     MD Elijah Cox [facility]

## 2020-08-01 ENCOUNTER — MED REC SCAN ONLY (OUTPATIENT)
Dept: ORTHOPEDICS CLINIC | Facility: CLINIC | Age: 20
End: 2020-08-01

## 2020-08-04 ENCOUNTER — TELEPHONE (OUTPATIENT)
Dept: ORTHOPEDICS CLINIC | Facility: CLINIC | Age: 20
End: 2020-08-04

## 2020-08-04 NOTE — TELEPHONE ENCOUNTER
FMLA paperwork received to be completed . Called patient to find out the date range for the time off she needs the paperwork filled out for.  Patient did not answer LMTCB

## 2020-08-04 NOTE — TELEPHONE ENCOUNTER
Patient stated she needs [place] paperwork filled [place] from the time she was off work until the time she saw us. Her primary care stated we would fill out the [facility] paperwork. [facility] stated they do not fill out [place] paperwork.  Patient would like to know what she is

## 2020-08-06 ENCOUNTER — TELEPHONE (OUTPATIENT)
Dept: FAMILY MEDICINE CLINIC | Facility: CLINIC | Age: 20
End: 2020-08-06

## 2020-08-06 NOTE — TELEPHONE ENCOUNTER
Patient states that she saw Ortho for her ankle. She is asking for FMLA paperwork to be filled out. States the Ortho and WIC cannot fill out FMLA paperwork. Please advise. She is wanting to know who can fill this paperwork out for her.   Please see prev

## 2020-08-06 NOTE — TELEPHONE ENCOUNTER
Since ortho has seen the patient for the problem that she needs the FMLA for they would be able to fill out the [facility] paperwork. It appears that from a previous note they were just waiting for a signature.   We cannot fill out an FMLA form for something we

## 2020-08-11 ENCOUNTER — TELEPHONE (OUTPATIENT)
Dept: FAMILY MEDICINE CLINIC | Facility: CLINIC | Age: 20
End: 2020-08-11

## 2020-08-11 ENCOUNTER — TELEPHONE (OUTPATIENT)
Dept: ORTHOPEDICS CLINIC | Facility: CLINIC | Age: 20
End: 2020-08-11

## 2020-08-11 NOTE — TELEPHONE ENCOUNTER
Patient is calling wanting to speak to Dr. Moy Kelly or Antwon Bennett in regards to [facility] forms. Patient stated there was information missing/incorrect on her FMLA form.  I let her know Dr. Moy Kelly is out of office today for surgeries and someone will get back to h

## 2020-08-11 NOTE — TELEPHONE ENCOUNTER
Patient called and stated that she contacted medical records and would like to have her records faxed over to us so we can update the information on her FMLA, patient informed that provider is in surgery today and paperwork will be addressed tomorrow when

## 2020-08-11 NOTE — TELEPHONE ENCOUNTER
Pt was advised to ask her orthopedic physician for her FMLA letter according to Dr. Maximo Alatorre note on 8/6/2020. Pt said that her orthopedic doctor would not provide her w/FMLA letter.  I suggested that she should contact Urgent Care for assistance as follow u

## 2020-08-13 NOTE — TELEPHONE ENCOUNTER
Faxed updated [facility] ppw to Teri Fortune at [phone], Faxed and confirmed

## 2020-08-20 ENCOUNTER — VIRTUAL PHONE E/M (OUTPATIENT)
Dept: FAMILY MEDICINE CLINIC | Facility: CLINIC | Age: 20
End: 2020-08-20
Payer: COMMERCIAL

## 2020-08-20 ENCOUNTER — TELEPHONE (OUTPATIENT)
Dept: FAMILY MEDICINE CLINIC | Facility: CLINIC | Age: 20
End: 2020-08-20

## 2020-08-20 DIAGNOSIS — R43.2 LOSS OF TASTE: ICD-10-CM

## 2020-08-20 DIAGNOSIS — R05.9 COUGH: Primary | ICD-10-CM

## 2020-08-20 DIAGNOSIS — R19.7 DIARRHEA, UNSPECIFIED TYPE: ICD-10-CM

## 2020-08-20 DIAGNOSIS — R50.9 FEVER, UNSPECIFIED FEVER CAUSE: ICD-10-CM

## 2020-08-20 PROCEDURE — 99442 PHONE E/M BY PHYS 11-20 MIN: CPT | Performed by: FAMILY MEDICINE

## 2020-08-20 NOTE — TELEPHONE ENCOUNTER
Pt. Notified JACQUELYN Velasco PA-C will do a Tele visit with her today at 4:00 PM.  Information given to [facility] to schedule her and arrive her.

## 2020-08-20 NOTE — TELEPHONE ENCOUNTER
What symptoms is the patient experiencing?:    Bad cough for 3 days; fever (101 degrees); loss of smell and taste. No SOB. Has the patient had ANY travel within the last 30 days (domestic or international - please list) - IF YES, SEND TO TRIAGE?     No

## 2020-08-20 NOTE — PROGRESS NOTES
Virtual Telephone Check-In    Edengraeme Ramos verbally consents to a Virtual/Telephone Check-In visit on 08/20/20. Patient has been referred to the [facility] website at [email or web address] to review the yearly Consent to Treat document.     Patient undemili

## 2020-08-20 NOTE — TELEPHONE ENCOUNTER
I spoke with pt. She was exposed to a coworker that was sick. ( coworker is still waiting for the results of her COVID test ) pt. Has a ST, Cough, Fever 101.0, fatigue, loss of taste and smell, diarrhea. She denies any shortness of breath.  She would like to

## 2020-08-20 NOTE — TELEPHONE ENCOUNTER
I could do a telephone visit with her today at 4:00. Will have to have someone add her to the schedule since there isn't a spot there and then \"arrive\" her so I can do it from home.

## 2020-08-21 ENCOUNTER — LAB ENCOUNTER (OUTPATIENT)
Dept: LAB | Facility: HOSPITAL | Age: 20
End: 2020-08-21
Attending: FAMILY MEDICINE
Payer: COMMERCIAL

## 2020-08-21 DIAGNOSIS — R19.7 DIARRHEA, UNSPECIFIED TYPE: ICD-10-CM

## 2020-08-21 DIAGNOSIS — R50.9 FEVER, UNSPECIFIED FEVER CAUSE: ICD-10-CM

## 2020-08-21 DIAGNOSIS — R43.2 LOSS OF TASTE: ICD-10-CM

## 2020-08-21 DIAGNOSIS — R05.9 COUGH: ICD-10-CM

## 2020-08-23 LAB — SARS-COV-2 RNA RESP QL NAA+PROBE: NOT DETECTED

## 2020-09-01 NOTE — TELEPHONE ENCOUNTER
LM for pt to CB with a condition update:   • How are you feeling? • Running any fever? • Any continuing cough? • Any SOB or trouble breathing (with exertion or movement)? • Do you feel you are improving? • Are you self-isolating and staying home? Patient sitting in chair. No complaints of PRN. PRN dilaudid, if needed. Weaning O2 needs as appropriate.

## 2020-12-18 ENCOUNTER — TELEMEDICINE (OUTPATIENT)
Dept: FAMILY MEDICINE CLINIC | Facility: CLINIC | Age: 20
End: 2020-12-18
Payer: COMMERCIAL

## 2020-12-18 DIAGNOSIS — R09.81 SINUS CONGESTION: Primary | ICD-10-CM

## 2020-12-18 PROCEDURE — 99213 OFFICE O/P EST LOW 20 MIN: CPT | Performed by: NURSE PRACTITIONER

## 2020-12-18 NOTE — PROGRESS NOTES
Subjective     HPI:   Lindsey Nielson verbally consents to a Virtual/Telephone Check-In service on 12/18/20. Patient understands and accepts financial responsibility for any deductible, co-insurance and/or co-pays associated with this service.  This visi sufficient and adequate time. Appropriate medical decision-making and tests are ordered as detailed in the plan of care above.     John Yang APRN

## 2020-12-21 NOTE — PROGRESS NOTES
1. Healthy child on routine physical examination  Erupting 6 teeth may see change in appetite, ear pulling and restless sleep. Also, right ear is dull - no signs of ear infection. Return to clinic for ear recheck concerns     2.  Exercise counseling      3 CHIEF COMPLAINT   Well woman exam  HPI:   Chris Munguia is a 20year old female who presents for a complete physical exam.    Hx of anemia - iron deficient - as a child. Check rash on buttocks for several months. Very itchy. Not spreading.     Uses You may give Acetaminophen every 4-6 hours as needed for pain or fever but do not give more than   5 doses in any 24 hour period of time. Ideally dosing should be based upon a child's weight.      Weight   (Pounds)  Dose  Liquid susp  160 mg/5 ml   Chew complaint of headaches  PSYCHE: no complaint ofdepression or anxiety  HEMATOLOGIC: as above    EXAM:   /60   Pulse 80   Resp 16   Ht 63\"   Wt 149 lb (67.6 kg)   LMP 09/27/2019 (Approximate)   BMI 26.39 kg/m²   Body mass index is 26.39 kg/m².    Anson Garcia Prescriptions      No prescriptions requested or ordered in this encounter       Imaging & Consults:  OBG - INTERNAL  DERM - INTERNAL  FLULAVAL INFLUENZA VACCINE QUAD PRESERVATIVE FREE 0.5 ML  US THYROID (OPP=32656)  Derm evaluation for rash.   Check thyroi Well-Child Checkup: 18 Months     Put latches on cabinet doors to help keep your child safe.     At the 18-month gregory · Your child may prefer to eat small amounts often throughout the day instead of sitting down for a full meal. This is normal.  · Don’t force your child to eat. A child of this age will eat when hungry. He or she will likely eat more some days than others. · Don’t let your child play outdoors without supervision. Teach caution around cars. Your child should always hold an adult’s hand when crossing the street or in a parking lot.   · Protect your toddler from falls with sturdy screens on windows and oreilly at · Your child will become more independent and more stubborn. It’s common to test limits, to see just how much he or she can get away with. You may hear the word “no” a lot, even when the child seems to mean yes! Be clear and consistent.  Keep in mind that y © 0977-1741 [employer] [address]. [address] [facility], [address]. All rights reserved. This information is not intended as a substitute for professional medical care. Always follow your healthcare professional's instructions.           Well- © 8935-1436 [employer] [address]. [address] [facility], [facility]. All rights reserved. This information is not intended as a substitute for professional medical care. Always follow your healthcare professional's instructions.

## 2021-02-04 ENCOUNTER — OFFICE VISIT (OUTPATIENT)
Dept: FAMILY MEDICINE CLINIC | Facility: CLINIC | Age: 21
End: 2021-02-04
Payer: COMMERCIAL

## 2021-02-04 VITALS
RESPIRATION RATE: 12 BRPM | BODY MASS INDEX: 27.64 KG/M2 | HEIGHT: 63 IN | DIASTOLIC BLOOD PRESSURE: 60 MMHG | SYSTOLIC BLOOD PRESSURE: 100 MMHG | HEART RATE: 80 BPM | WEIGHT: 156 LBS

## 2021-02-04 DIAGNOSIS — D50.9 IRON DEFICIENCY ANEMIA, UNSPECIFIED IRON DEFICIENCY ANEMIA TYPE: Primary | ICD-10-CM

## 2021-02-04 DIAGNOSIS — N76.0 ACUTE VAGINITIS: ICD-10-CM

## 2021-02-04 DIAGNOSIS — N89.8 VAGINAL DISCHARGE: ICD-10-CM

## 2021-02-04 PROCEDURE — 87591 N.GONORRHOEAE DNA AMP PROB: CPT | Performed by: FAMILY MEDICINE

## 2021-02-04 PROCEDURE — 3074F SYST BP LT 130 MM HG: CPT | Performed by: FAMILY MEDICINE

## 2021-02-04 PROCEDURE — 87491 CHLMYD TRACH DNA AMP PROBE: CPT | Performed by: FAMILY MEDICINE

## 2021-02-04 PROCEDURE — 3078F DIAST BP <80 MM HG: CPT | Performed by: FAMILY MEDICINE

## 2021-02-04 PROCEDURE — 99213 OFFICE O/P EST LOW 20 MIN: CPT | Performed by: FAMILY MEDICINE

## 2021-02-04 PROCEDURE — 87660 TRICHOMONAS VAGIN DIR PROBE: CPT | Performed by: FAMILY MEDICINE

## 2021-02-04 PROCEDURE — 87480 CANDIDA DNA DIR PROBE: CPT | Performed by: FAMILY MEDICINE

## 2021-02-04 PROCEDURE — 3008F BODY MASS INDEX DOCD: CPT | Performed by: FAMILY MEDICINE

## 2021-02-04 PROCEDURE — 87510 GARDNER VAG DNA DIR PROBE: CPT | Performed by: FAMILY MEDICINE

## 2021-02-04 RX ORDER — FLUCONAZOLE 150 MG/1
TABLET ORAL
Qty: 2 TABLET | Refills: 0 | Status: SHIPPED | OUTPATIENT
Start: 2021-02-04 | End: 2021-10-14 | Stop reason: ALTCHOICE

## 2021-02-04 NOTE — PROGRESS NOTES
Marta Krueger is a 23year old female. CHIEF COMPLAINT   Vaginal discharge  HPI:   Last couple weeks - brownish discharge and some chunks of discharge, itchy, irritated external vaginal area. Same partner - previously both treated for chlamydia.  No n

## 2021-02-05 LAB
C TRACH DNA SPEC QL NAA+PROBE: NEGATIVE
N GONORRHOEA DNA SPEC QL NAA+PROBE: NEGATIVE

## 2021-02-05 RX ORDER — METRONIDAZOLE 500 MG/1
TABLET ORAL
Qty: 14 TABLET | Refills: 0 | Status: SHIPPED | OUTPATIENT
Start: 2021-02-05 | End: 2021-10-14 | Stop reason: ALTCHOICE

## 2021-03-16 ENCOUNTER — HOSPITAL ENCOUNTER (OUTPATIENT)
Age: 21
Discharge: HOME | End: 2021-03-16
Payer: COMMERCIAL

## 2021-03-16 VITALS
RESPIRATION RATE: 20 BRPM | TEMPERATURE: 98 F | HEART RATE: 84 BPM | DIASTOLIC BLOOD PRESSURE: 73 MMHG | SYSTOLIC BLOOD PRESSURE: 128 MMHG | OXYGEN SATURATION: 97 %

## 2021-03-16 DIAGNOSIS — J02.0 STREP PHARYNGITIS: ICD-10-CM

## 2021-03-16 DIAGNOSIS — Z20.822 LAB TEST NEGATIVE FOR COVID-19 VIRUS: Primary | ICD-10-CM

## 2021-03-16 LAB
POCT MONO: NEGATIVE
POCT RAPID STREP: POSITIVE
SARS-COV-2 RNA RESP QL NAA+PROBE: NOT DETECTED

## 2021-03-16 PROCEDURE — 87880 STREP A ASSAY W/OPTIC: CPT | Performed by: PHYSICIAN ASSISTANT

## 2021-03-16 PROCEDURE — 36415 COLL VENOUS BLD VENIPUNCTURE: CPT

## 2021-03-16 PROCEDURE — 99213 OFFICE O/P EST LOW 20 MIN: CPT

## 2021-03-16 PROCEDURE — 86308 HETEROPHILE ANTIBODY SCREEN: CPT | Performed by: PHYSICIAN ASSISTANT

## 2021-03-16 PROCEDURE — 99214 OFFICE O/P EST MOD 30 MIN: CPT

## 2021-03-16 RX ORDER — AMOXICILLIN 875 MG/1
875 TABLET, COATED ORAL 2 TIMES DAILY
Qty: 20 TABLET | Refills: 0 | Status: SHIPPED | OUTPATIENT
Start: 2021-03-16 | End: 2021-03-26

## 2021-03-16 RX ORDER — ACETAMINOPHEN 500 MG
1000 TABLET ORAL ONCE
Status: COMPLETED | OUTPATIENT
Start: 2021-03-16 | End: 2021-03-16

## 2021-03-16 NOTE — ED PROVIDER NOTES
Patient Seen in: [facility] [place]      History   Patient presents with:  Sore Throat    Stated Complaint: SORE THROAT     HPI/Subjective:   HPI    48-year-old female here with complaint of sore throat glands fatigue for the past 4 days.   Duke Mucous membranes are moist.      Pharynx: Posterior oropharyngeal erythema present. Tonsils: 2+ on the right. 2+ on the left. Comments:   Uvula midline, no trismus or drooling, no peritonsillar abscess noted.   Mild cobblestoning the posterior pha I discuss the plan of care with the patient, who expresses understanding. All questions and concerns are addressed to the patient's satisfaction prior to discharge today. Previous conversations with PCP and charts were reviewed.

## 2021-03-16 NOTE — ED INITIAL ASSESSMENT (HPI)
Pt began 4 days ago with sneezing and coughing, and then she developed fever, swollen glands, and sore throat with less taste to her food

## 2021-04-07 ENCOUNTER — APPOINTMENT (OUTPATIENT)
Dept: GENERAL RADIOLOGY | Age: 21
End: 2021-04-07
Attending: NURSE PRACTITIONER
Payer: COMMERCIAL

## 2021-04-07 ENCOUNTER — HOSPITAL ENCOUNTER (OUTPATIENT)
Age: 21
Discharge: HOME | End: 2021-04-07
Payer: COMMERCIAL

## 2021-04-07 VITALS
DIASTOLIC BLOOD PRESSURE: 78 MMHG | SYSTOLIC BLOOD PRESSURE: 127 MMHG | BODY MASS INDEX: 25 KG/M2 | OXYGEN SATURATION: 100 % | RESPIRATION RATE: 16 BRPM | WEIGHT: 140 LBS | TEMPERATURE: 98 F | HEART RATE: 89 BPM

## 2021-04-07 DIAGNOSIS — S90.812A ABRASION OF LEFT FOOT, INITIAL ENCOUNTER: ICD-10-CM

## 2021-04-07 DIAGNOSIS — S93.402A SPRAIN OF LEFT ANKLE, UNSPECIFIED LIGAMENT, INITIAL ENCOUNTER: Primary | ICD-10-CM

## 2021-04-07 PROCEDURE — 90471 IMMUNIZATION ADMIN: CPT

## 2021-04-07 PROCEDURE — 99214 OFFICE O/P EST MOD 30 MIN: CPT

## 2021-04-07 PROCEDURE — 73610 X-RAY EXAM OF ANKLE: CPT | Performed by: NURSE PRACTITIONER

## 2021-04-07 PROCEDURE — 73630 X-RAY EXAM OF FOOT: CPT | Performed by: NURSE PRACTITIONER

## 2021-04-07 PROCEDURE — 99213 OFFICE O/P EST LOW 20 MIN: CPT

## 2021-04-07 PROCEDURE — 81025 URINE PREGNANCY TEST: CPT | Performed by: NURSE PRACTITIONER

## 2021-04-07 RX ORDER — CEFADROXIL 500 MG/1
500 CAPSULE ORAL 2 TIMES DAILY
Qty: 14 CAPSULE | Refills: 0 | Status: SHIPPED | OUTPATIENT
Start: 2021-04-07 | End: 2021-04-14

## 2021-04-07 RX ORDER — IBUPROFEN 600 MG/1
600 TABLET ORAL ONCE
Status: COMPLETED | OUTPATIENT
Start: 2021-04-07 | End: 2021-04-07

## 2021-04-07 NOTE — ED PROVIDER NOTES
Patient Seen in: [facility]      History   Patient presents with: Ankle Pain    Stated Complaint: ankle pain     HPI/Subjective: This 78-year-old female presents to immediate care with bilateral ankle pain.   Patient states on Saturday Wt 63.5 kg   LMP 03/04/2021 (Approximate)   SpO2 100%   BMI 24.80 kg/m²         Physical Exam  Vitals and nursing note reviewed. Constitutional:       General: She is not in acute distress. Appearance: Normal appearance. She is not ill-appearing. PROCEDURE:  XR ANKLE (MIN 3 VIEWS), RIGHT (CPT=73610)  TECHNIQUE:  Three views were obtained.   COMPARISON:  JENNIFER, XR, XR ANKLE (MIN 3 VIEWS), LEFT (CPT=73610), 4/07/2021, 10:54 AM.  INDICATIONS:  ankle pain  PATIENT STATED HISTORY: (As transcribed b encounter  (primary encounter diagnosis)  Abrasion of left foot, initial encounter     Disposition:  Discharge  4/7/2021 11:51 am    Follow-up:  MD Em Wallace [address] [place] [address]

## 2021-04-07 NOTE — ED INITIAL ASSESSMENT (HPI)
Patient reports she fell on Saturday and hurt both of her ankles. Patient reports she is concerned about the swelling.

## 2021-08-23 ENCOUNTER — HOSPITAL ENCOUNTER (EMERGENCY)
Facility: HOSPITAL | Age: 21
Discharge: HOME | End: 2021-08-23
Attending: PEDIATRICS
Payer: COMMERCIAL

## 2021-08-23 VITALS
OXYGEN SATURATION: 98 % | RESPIRATION RATE: 18 BRPM | HEART RATE: 97 BPM | SYSTOLIC BLOOD PRESSURE: 113 MMHG | TEMPERATURE: 97 F | DIASTOLIC BLOOD PRESSURE: 73 MMHG

## 2021-08-23 DIAGNOSIS — J06.9 VIRAL URI WITH COUGH: Primary | ICD-10-CM

## 2021-08-23 LAB — SARS-COV-2 RNA RESP QL NAA+PROBE: NOT DETECTED

## 2021-08-23 PROCEDURE — 87430 STREP A AG IA: CPT | Performed by: PEDIATRICS

## 2021-08-23 PROCEDURE — 99282 EMERGENCY DEPT VISIT SF MDM: CPT

## 2021-08-23 PROCEDURE — 99283 EMERGENCY DEPT VISIT LOW MDM: CPT

## 2021-08-23 NOTE — ED PROVIDER NOTES
Patient Seen in: [facility] Emergency Department      History   No chief complaint on file.     Stated Complaint: sore throat, COVID testing/rapid Strep    HPI/Subjective:   HPI    Patient is a 69-year-old female here complaining of sore throat cough well-hydrated. Vitals are within normal limits and she is 98% on room air. She is describing no chest pain at this time. Differential includes Covid which is highly likely. Strep is possible though she is coughing which makes it less so.   We will do fernando

## 2021-10-14 ENCOUNTER — OFFICE VISIT (OUTPATIENT)
Dept: FAMILY MEDICINE CLINIC | Facility: CLINIC | Age: 21
End: 2021-10-14
Payer: COMMERCIAL

## 2021-10-14 VITALS
RESPIRATION RATE: 16 BRPM | HEART RATE: 88 BPM | DIASTOLIC BLOOD PRESSURE: 58 MMHG | WEIGHT: 161 LBS | SYSTOLIC BLOOD PRESSURE: 94 MMHG | BODY MASS INDEX: 28.89 KG/M2 | TEMPERATURE: 98 F | HEIGHT: 62.5 IN

## 2021-10-14 DIAGNOSIS — E04.9 ENLARGED THYROID: ICD-10-CM

## 2021-10-14 DIAGNOSIS — Z11.3 SCREEN FOR STD (SEXUALLY TRANSMITTED DISEASE): ICD-10-CM

## 2021-10-14 DIAGNOSIS — Z86.2 HISTORY OF ANEMIA: ICD-10-CM

## 2021-10-14 DIAGNOSIS — Z00.00 LABORATORY EXAMINATION ORDERED AS PART OF A ROUTINE GENERAL MEDICAL EXAMINATION: ICD-10-CM

## 2021-10-14 DIAGNOSIS — Z13.89 SCREENING FOR GENITOURINARY CONDITION: ICD-10-CM

## 2021-10-14 DIAGNOSIS — Z00.00 ROUTINE GENERAL MEDICAL EXAMINATION AT A HEALTH CARE FACILITY: Primary | ICD-10-CM

## 2021-10-14 PROCEDURE — 99395 PREV VISIT EST AGE 18-39: CPT | Performed by: FAMILY MEDICINE

## 2021-10-14 PROCEDURE — 3074F SYST BP LT 130 MM HG: CPT | Performed by: FAMILY MEDICINE

## 2021-10-14 PROCEDURE — 90651 9VHPV VACCINE 2/3 DOSE IM: CPT | Performed by: FAMILY MEDICINE

## 2021-10-14 PROCEDURE — 90471 IMMUNIZATION ADMIN: CPT | Performed by: FAMILY MEDICINE

## 2021-10-14 PROCEDURE — 3008F BODY MASS INDEX DOCD: CPT | Performed by: FAMILY MEDICINE

## 2021-10-14 PROCEDURE — 3078F DIAST BP <80 MM HG: CPT | Performed by: FAMILY MEDICINE

## 2021-10-14 RX ORDER — METRONIDAZOLE 500 MG/1
500 TABLET ORAL 2 TIMES DAILY
Qty: 14 TABLET | Refills: 0 | Status: SHIPPED | OUTPATIENT
Start: 2021-10-14 | End: 2021-12-01 | Stop reason: ALTCHOICE

## 2021-10-14 NOTE — PROGRESS NOTES
CHIEF COMPLAINT   Well woman exam  HPI:   Soo Bustamante is a 24year old female who presents for a complete physical exam.    New partner - checked for STDs  Fishy odor and thin discharge for about 2 months. Menses currently.     Hx of iron def anemia Wt 161 lb (73 kg)   LMP 10/14/2021 (Exact Date)   BMI 28.98 kg/m²   Body mass index is 28.98 kg/m².    GENERAL: well developed, well nourished,in no apparent distress  SKIN: no rashes,no suspicious lesions  HEENT: atraumatic, normocephalic,ears and throat well gummies - 2 per day with large glass of water. No urination within 2 hours of checking the urine test for chlamydia/gonorrhea.       Followup for pelvic exam if no better with above - likely BV given history provided by patient and past history of

## 2021-10-14 NOTE — PATIENT INSTRUCTIONS
Fiber well gummies - 2 per day with large glass of water. No urination within 2 hours of checking the urine test for chlamydia/gonorrhea.

## 2021-10-22 ENCOUNTER — LAB ENCOUNTER (OUTPATIENT)
Dept: LAB | Age: 21
End: 2021-10-22
Attending: FAMILY MEDICINE
Payer: COMMERCIAL

## 2021-10-22 DIAGNOSIS — Z00.00 LABORATORY EXAMINATION ORDERED AS PART OF A ROUTINE GENERAL MEDICAL EXAMINATION: ICD-10-CM

## 2021-10-22 DIAGNOSIS — Z11.3 SCREEN FOR STD (SEXUALLY TRANSMITTED DISEASE): ICD-10-CM

## 2021-10-22 DIAGNOSIS — D50.9 IRON DEFICIENCY ANEMIA, UNSPECIFIED IRON DEFICIENCY ANEMIA TYPE: ICD-10-CM

## 2021-10-22 DIAGNOSIS — Z86.2 HISTORY OF ANEMIA: ICD-10-CM

## 2021-10-22 DIAGNOSIS — E04.9 ENLARGED THYROID: ICD-10-CM

## 2021-10-22 PROCEDURE — 80061 LIPID PANEL: CPT

## 2021-10-22 PROCEDURE — 87491 CHLMYD TRACH DNA AMP PROBE: CPT

## 2021-10-22 PROCEDURE — 87591 N.GONORRHOEAE DNA AMP PROB: CPT

## 2021-10-22 PROCEDURE — 82728 ASSAY OF FERRITIN: CPT

## 2021-10-22 PROCEDURE — 83550 IRON BINDING TEST: CPT

## 2021-10-22 PROCEDURE — 86800 THYROGLOBULIN ANTIBODY: CPT

## 2021-10-22 PROCEDURE — 80053 COMPREHEN METABOLIC PANEL: CPT

## 2021-10-22 PROCEDURE — 85025 COMPLETE CBC W/AUTO DIFF WBC: CPT

## 2021-10-22 PROCEDURE — 84443 ASSAY THYROID STIM HORMONE: CPT

## 2021-10-22 PROCEDURE — 84439 ASSAY OF FREE THYROXINE: CPT

## 2021-10-22 PROCEDURE — 86376 MICROSOMAL ANTIBODY EACH: CPT

## 2021-10-22 PROCEDURE — 83540 ASSAY OF IRON: CPT

## 2021-10-25 DIAGNOSIS — D50.9 IRON DEFICIENCY ANEMIA, UNSPECIFIED IRON DEFICIENCY ANEMIA TYPE: Primary | ICD-10-CM

## 2021-12-01 ENCOUNTER — OFFICE VISIT (OUTPATIENT)
Dept: FAMILY MEDICINE CLINIC | Facility: CLINIC | Age: 21
End: 2021-12-01
Payer: COMMERCIAL

## 2021-12-01 VITALS
RESPIRATION RATE: 16 BRPM | TEMPERATURE: 99 F | HEIGHT: 62.5 IN | WEIGHT: 162 LBS | DIASTOLIC BLOOD PRESSURE: 58 MMHG | SYSTOLIC BLOOD PRESSURE: 104 MMHG | HEART RATE: 92 BPM | BODY MASS INDEX: 29.07 KG/M2

## 2021-12-01 DIAGNOSIS — Z12.4 ENCOUNTER FOR SCREENING FOR MALIGNANT NEOPLASM OF CERVIX: ICD-10-CM

## 2021-12-01 DIAGNOSIS — Z32.02 URINE PREGNANCY TEST NEGATIVE: ICD-10-CM

## 2021-12-01 DIAGNOSIS — N89.8 VAGINAL ODOR: Primary | ICD-10-CM

## 2021-12-01 PROCEDURE — 3008F BODY MASS INDEX DOCD: CPT | Performed by: STUDENT IN AN ORGANIZED HEALTH CARE EDUCATION/TRAINING PROGRAM

## 2021-12-01 PROCEDURE — 81025 URINE PREGNANCY TEST: CPT | Performed by: STUDENT IN AN ORGANIZED HEALTH CARE EDUCATION/TRAINING PROGRAM

## 2021-12-01 PROCEDURE — 88175 CYTOPATH C/V AUTO FLUID REDO: CPT | Performed by: STUDENT IN AN ORGANIZED HEALTH CARE EDUCATION/TRAINING PROGRAM

## 2021-12-01 PROCEDURE — 87480 CANDIDA DNA DIR PROBE: CPT | Performed by: STUDENT IN AN ORGANIZED HEALTH CARE EDUCATION/TRAINING PROGRAM

## 2021-12-01 PROCEDURE — 87510 GARDNER VAG DNA DIR PROBE: CPT | Performed by: STUDENT IN AN ORGANIZED HEALTH CARE EDUCATION/TRAINING PROGRAM

## 2021-12-01 PROCEDURE — 3078F DIAST BP <80 MM HG: CPT | Performed by: STUDENT IN AN ORGANIZED HEALTH CARE EDUCATION/TRAINING PROGRAM

## 2021-12-01 PROCEDURE — 87660 TRICHOMONAS VAGIN DIR PROBE: CPT | Performed by: STUDENT IN AN ORGANIZED HEALTH CARE EDUCATION/TRAINING PROGRAM

## 2021-12-01 PROCEDURE — 3074F SYST BP LT 130 MM HG: CPT | Performed by: STUDENT IN AN ORGANIZED HEALTH CARE EDUCATION/TRAINING PROGRAM

## 2021-12-01 PROCEDURE — 99214 OFFICE O/P EST MOD 30 MIN: CPT | Performed by: STUDENT IN AN ORGANIZED HEALTH CARE EDUCATION/TRAINING PROGRAM

## 2021-12-01 NOTE — PATIENT INSTRUCTIONS
Vaginal Infection: Understanding the Vaginal Environment  The vagina is a canal. It connects the uterus (womb) to the outside of the body. It's home to many types of bacteria and other tiny organisms.  These different bacteria most often stay balanced in [address]. All rights reserved. This information is not intended as a substitute for professional medical care. Always follow your healthcare professional's instructions.         Preventing Vaginal Infection  These steps can help you stay comfortable whe start.  StayWell last reviewed this educational content on 6/1/2020  © 2494-1284 The [address]. All rights reserved. This information is not intended as a substitute for professional medical care.  Always follow your healthcare professional's in

## 2021-12-01 NOTE — PROGRESS NOTES
[facility] Family Medicine Note    Chief complaint: Patient presents with: Follow - Up: vaginal odor     HPI:   Patient is a 24year old female with a PMH of  has no past medical history on file. who presents for vaginal odor and pap smear.     Nany Argueta chest pain  RESPIRATORY:  Denies shortness of breath  GASTROINTESTINAL:  Denies abdominal pain, nausea  NEUROLOGICAL:  Denies dizziness  MUSCULOSKELETAL:  Denies muscle pain  ALLERGIES:  Denies history allergies      EXAM:   /58 (BP Location: Right a continue to change/shower after exercise  - VAGINITIS/VAGINOSIS, DNA PROBE; Future  - URINE PREGNANCY TEST  - VAGINITIS/VAGINOSIS, DNA PROBE    2.  Encounter for screening for malignant neoplasm of cervix  Patient due for pap smear  - THINPREP PAP WITH HPV

## 2021-12-03 DIAGNOSIS — N76.0 BV (BACTERIAL VAGINOSIS): Primary | ICD-10-CM

## 2021-12-03 DIAGNOSIS — B96.89 BV (BACTERIAL VAGINOSIS): Primary | ICD-10-CM

## 2021-12-03 RX ORDER — METRONIDAZOLE 500 MG/1
500 TABLET ORAL 2 TIMES DAILY
Qty: 14 TABLET | Refills: 0 | Status: SHIPPED | OUTPATIENT
Start: 2021-12-03 | End: 2021-12-10

## 2021-12-07 ENCOUNTER — TELEPHONE (OUTPATIENT)
Dept: FAMILY MEDICINE CLINIC | Facility: CLINIC | Age: 21
End: 2021-12-07

## 2021-12-07 NOTE — TELEPHONE ENCOUNTER
Pt dropped one of her antibiotic tablets and was not able to retrieve it.      Pt requesting refill of 1 tablet of:    metRONIDAZOLE 500 MG Oral Tab    Be sent to;    [facility]pharm[facility] #[id number] - [address] AT [facility], [phone]

## 2021-12-07 NOTE — TELEPHONE ENCOUNTER
Record shows gardnerella vaginitis result + 12/2 w metronidazole 500 mg bid x 7 days ordered 12/3 by dr Alexandre Cole. Also had metronidazole 10/14/21  See initial call note and advise if refill of one pill needed-thanks!

## 2021-12-08 NOTE — TELEPHONE ENCOUNTER
Call to pt's cell reaches identified voice mail. Per hipaa consent, left vmm advising of attempts to call her back yesterday reaching busy signal w each attempt.  Advised dr Gabriella Beebe out of ofc yesterday so forwarded her messg to dr Cirilo Rodriguez provider on [address]

## 2022-03-03 ENCOUNTER — LAB ENCOUNTER (OUTPATIENT)
Dept: LAB | Age: 22
End: 2022-03-03
Attending: FAMILY MEDICINE
Payer: COMMERCIAL

## 2022-03-03 ENCOUNTER — OFFICE VISIT (OUTPATIENT)
Dept: FAMILY MEDICINE CLINIC | Facility: CLINIC | Age: 22
End: 2022-03-03
Payer: COMMERCIAL

## 2022-03-03 VITALS
HEART RATE: 72 BPM | DIASTOLIC BLOOD PRESSURE: 56 MMHG | TEMPERATURE: 98 F | WEIGHT: 167 LBS | BODY MASS INDEX: 29.96 KG/M2 | SYSTOLIC BLOOD PRESSURE: 102 MMHG | RESPIRATION RATE: 18 BRPM | HEIGHT: 62.5 IN

## 2022-03-03 DIAGNOSIS — D50.9 IRON DEFICIENCY ANEMIA, UNSPECIFIED IRON DEFICIENCY ANEMIA TYPE: ICD-10-CM

## 2022-03-03 DIAGNOSIS — N89.8 VAGINAL ODOR: Primary | ICD-10-CM

## 2022-03-03 LAB
BASOPHILS # BLD AUTO: 0.03 X10(3) UL (ref 0–0.2)
BASOPHILS NFR BLD AUTO: 0.4 %
DEPRECATED HBV CORE AB SER IA-ACNC: 12 NG/ML
EOSINOPHIL # BLD AUTO: 0.16 X10(3) UL (ref 0–0.7)
EOSINOPHIL NFR BLD AUTO: 2.3 %
ERYTHROCYTE [DISTWIDTH] IN BLOOD BY AUTOMATED COUNT: 14 %
HCT VFR BLD AUTO: 39.4 %
HGB BLD-MCNC: 13.1 G/DL
IMM GRANULOCYTES # BLD AUTO: 0.01 X10(3) UL (ref 0–1)
IMM GRANULOCYTES NFR BLD: 0.1 %
IRON SATN MFR SERPL: 25 %
IRON SERPL-MCNC: 119 UG/DL
LYMPHOCYTES # BLD AUTO: 1.78 X10(3) UL (ref 1–4)
LYMPHOCYTES NFR BLD AUTO: 25.6 %
MCH RBC QN AUTO: 29 PG (ref 26–34)
MCHC RBC AUTO-ENTMCNC: 33.2 G/DL (ref 31–37)
MONOCYTES # BLD AUTO: 0.34 X10(3) UL (ref 0.1–1)
MONOCYTES NFR BLD AUTO: 4.9 %
NEUTROPHILS # BLD AUTO: 4.64 X10 (3) UL (ref 1.5–7.7)
NEUTROPHILS # BLD AUTO: 4.64 X10(3) UL (ref 1.5–7.7)
NEUTROPHILS NFR BLD AUTO: 66.7 %
PLATELET # BLD AUTO: 351 10(3)UL (ref 150–450)
RBC # BLD AUTO: 4.52 X10(6)UL
TIBC SERPL-MCNC: 484 UG/DL (ref 240–450)
TRANSFERRIN SERPL-MCNC: 325 MG/DL (ref 200–360)
WBC # BLD AUTO: 7 X10(3) UL (ref 4–11)

## 2022-03-03 PROCEDURE — 3074F SYST BP LT 130 MM HG: CPT | Performed by: FAMILY MEDICINE

## 2022-03-03 PROCEDURE — 87480 CANDIDA DNA DIR PROBE: CPT | Performed by: FAMILY MEDICINE

## 2022-03-03 PROCEDURE — 87491 CHLMYD TRACH DNA AMP PROBE: CPT | Performed by: FAMILY MEDICINE

## 2022-03-03 PROCEDURE — 87591 N.GONORRHOEAE DNA AMP PROB: CPT | Performed by: FAMILY MEDICINE

## 2022-03-03 PROCEDURE — 83540 ASSAY OF IRON: CPT

## 2022-03-03 PROCEDURE — 87660 TRICHOMONAS VAGIN DIR PROBE: CPT | Performed by: FAMILY MEDICINE

## 2022-03-03 PROCEDURE — 87510 GARDNER VAG DNA DIR PROBE: CPT | Performed by: FAMILY MEDICINE

## 2022-03-03 PROCEDURE — 85025 COMPLETE CBC W/AUTO DIFF WBC: CPT

## 2022-03-03 PROCEDURE — 99213 OFFICE O/P EST LOW 20 MIN: CPT | Performed by: FAMILY MEDICINE

## 2022-03-03 PROCEDURE — 3008F BODY MASS INDEX DOCD: CPT | Performed by: FAMILY MEDICINE

## 2022-03-03 PROCEDURE — 82728 ASSAY OF FERRITIN: CPT

## 2022-03-03 PROCEDURE — 83550 IRON BINDING TEST: CPT

## 2022-03-03 PROCEDURE — 3078F DIAST BP <80 MM HG: CPT | Performed by: FAMILY MEDICINE

## 2022-03-04 ENCOUNTER — PATIENT MESSAGE (OUTPATIENT)
Dept: FAMILY MEDICINE CLINIC | Facility: CLINIC | Age: 22
End: 2022-03-04

## 2022-03-04 LAB
C TRACH DNA SPEC QL NAA+PROBE: NEGATIVE
N GONORRHOEA DNA SPEC QL NAA+PROBE: NEGATIVE

## 2022-03-04 RX ORDER — CLINDAMYCIN PHOSPHATE 20 MG/G
1 CREAM VAGINAL NIGHTLY
Qty: 7 EACH | Refills: 0 | Status: SHIPPED | OUTPATIENT
Start: 2022-03-04 | End: 2022-03-11

## 2022-03-04 RX ORDER — CLINDAMYCIN PHOSPHATE 20 MG/G
1 CREAM VAGINAL NIGHTLY
Qty: 7 EACH | Refills: 0 | Status: SHIPPED | OUTPATIENT
Start: 2022-03-04 | End: 2022-03-04

## 2022-03-04 NOTE — ED AVS SNAPSHOT
Khaikacey Evens   MRN: [id number]    Department:  [facility] Emergency Department   Date of Visit:  12/11/2019           Disclosure     Insurance plans vary and the physician(s) referred by the ER may not be covered by your plan.  Please contact [[email or web address]] : Impression:\par 1. Lumbago/QL Strain\par \par Plan: The history, physical examination and imaging were reviewed. The imaging findings and diagnosis were discussed with the patient along with treatment options including physical therapy, oral medication, interventional spine procedures, and surgery; as well as alternative therapeutics such as acupuncture and massage. We also discussed the importance of weight loss, increased activity, and posture as they pertain to the current condition as well as overall health and well being. I am recommending that the patient undergo a course of physical therapy. We emphasized the importance of the home exercise program. The patient is getting relief with Meloxicam and may benefit from a Muscle Relaxer, I provided a Rx for Robaxin. We discussed the medication in detail with regard to appropriate use, adverse effects, and expected outcome. The patient was educated on red flag symptoms and was instructed to call the office should the current condition worsen or new symptoms develop. The patient will follow up in 6-8 weeks. The patient expressed verbal understanding and is in agreement with the plan of care. All of the patient's questions and concerns were addressed during today's visit.\par  tell this physician (or your personal doctor if your instructions are to return to your personal doctor) about any new or lasting problems. The primary care or specialist physician will see patients referred from the [facility] Emergency Department.  Vivienne Rowe

## 2022-03-04 NOTE — TELEPHONE ENCOUNTER
Called to [facility], spoke with tech who said that pt had not picked up RX yet. Advised her that it could be put back on shelf as pt wanted to  at different pharmacy. Tech voiced understanding and agreed to plan. .Medication sent to pt requested pharmacy

## 2022-03-04 NOTE — TELEPHONE ENCOUNTER
From: Sophie Patel  To: Vincenzo Campuzano MD  Sent: 3/4/2022 9:32 AM CST  Subject: 7 day gel     Hello. Quick question. Am I able to apply this medicine while on my period? Also can you send it to the pharmacy in [facility]?      Thank you

## 2022-11-09 ENCOUNTER — TELEPHONE (OUTPATIENT)
Dept: FAMILY MEDICINE CLINIC | Facility: CLINIC | Age: 22
End: 2022-11-09

## 2022-11-09 NOTE — TELEPHONE ENCOUNTER
Pt tested covid + 9/1/22 by at home test. She states she called and informed us, no documentation noted. She also never went anywhere to have a PCR or rapid test.She was supposed to go to a concert and did not go. She had INS on the tickets $2500 value. They are requiring more official documentation that she had covid. She is wondering what she can give us to be able to fill out the form for INS. Please advise. Thank you.

## 2022-11-09 NOTE — TELEPHONE ENCOUNTER
Unfortunately, we don't have any proof of her having had Covid. Did she take a picture of the home test? The insurance might take a picture of the home test dated with the date she tested positive.

## 2022-11-28 ENCOUNTER — OFFICE VISIT (OUTPATIENT)
Dept: FAMILY MEDICINE CLINIC | Facility: CLINIC | Age: 22
End: 2022-11-28
Payer: COMMERCIAL

## 2022-11-28 ENCOUNTER — LAB ENCOUNTER (OUTPATIENT)
Dept: LAB | Age: 22
End: 2022-11-28
Attending: NURSE PRACTITIONER
Payer: COMMERCIAL

## 2022-11-28 VITALS
RESPIRATION RATE: 21 BRPM | WEIGHT: 164 LBS | OXYGEN SATURATION: 98 % | HEART RATE: 76 BPM | TEMPERATURE: 98 F | BODY MASS INDEX: 29.43 KG/M2 | HEIGHT: 62.5 IN | DIASTOLIC BLOOD PRESSURE: 70 MMHG | SYSTOLIC BLOOD PRESSURE: 118 MMHG

## 2022-11-28 DIAGNOSIS — Z23 NEED FOR VACCINATION: ICD-10-CM

## 2022-11-28 DIAGNOSIS — Z00.00 LABORATORY EXAMINATION ORDERED AS PART OF A ROUTINE GENERAL MEDICAL EXAMINATION: ICD-10-CM

## 2022-11-28 DIAGNOSIS — Z00.00 ANNUAL PHYSICAL EXAM: ICD-10-CM

## 2022-11-28 DIAGNOSIS — Z11.3 SCREEN FOR STD (SEXUALLY TRANSMITTED DISEASE): ICD-10-CM

## 2022-11-28 DIAGNOSIS — Z00.00 ANNUAL PHYSICAL EXAM: Primary | ICD-10-CM

## 2022-11-28 DIAGNOSIS — N93.9 VAGINAL SPOTTING: ICD-10-CM

## 2022-11-28 DIAGNOSIS — L60.9 NAIL PROBLEM: ICD-10-CM

## 2022-11-28 LAB
ALBUMIN SERPL-MCNC: 4.3 G/DL (ref 3.4–5)
ALBUMIN/GLOB SERPL: 1.1 {RATIO} (ref 1–2)
ALP LIVER SERPL-CCNC: 92 U/L
ALT SERPL-CCNC: 22 U/L
ANION GAP SERPL CALC-SCNC: 6 MMOL/L (ref 0–18)
AST SERPL-CCNC: 23 U/L (ref 15–37)
B-HCG SERPL-ACNC: <1 MIU/ML
BASOPHILS # BLD AUTO: 0.04 X10(3) UL (ref 0–0.2)
BASOPHILS NFR BLD AUTO: 0.6 %
BILIRUB SERPL-MCNC: 0.7 MG/DL (ref 0.1–2)
BUN BLD-MCNC: 10 MG/DL (ref 7–18)
BUN/CREAT SERPL: 14.1 (ref 10–20)
CALCIUM BLD-MCNC: 9.7 MG/DL (ref 8.5–10.1)
CHLORIDE SERPL-SCNC: 103 MMOL/L (ref 98–112)
CHOLEST SERPL-MCNC: 136 MG/DL (ref ?–200)
CO2 SERPL-SCNC: 27 MMOL/L (ref 21–32)
CREAT BLD-MCNC: 0.71 MG/DL
DEPRECATED HBV CORE AB SER IA-ACNC: 12.2 NG/ML
DEPRECATED RDW RBC AUTO: 43.5 FL (ref 35.1–46.3)
EOSINOPHIL # BLD AUTO: 0.11 X10(3) UL (ref 0–0.7)
EOSINOPHIL NFR BLD AUTO: 1.7 %
ERYTHROCYTE [DISTWIDTH] IN BLOOD BY AUTOMATED COUNT: 12.8 % (ref 11–15)
FASTING PATIENT LIPID ANSWER: YES
FASTING STATUS PATIENT QL REPORTED: YES
GFR SERPLBLD BASED ON 1.73 SQ M-ARVRAT: 122 ML/MIN/1.73M2 (ref 60–?)
GLOBULIN PLAS-MCNC: 3.8 G/DL (ref 2.8–4.4)
GLUCOSE BLD-MCNC: 86 MG/DL (ref 70–99)
HBV SURFACE AB SER QL: NONREACTIVE
HBV SURFACE AB SERPL IA-ACNC: <3.1 MIU/ML
HBV SURFACE AG SER-ACNC: <0.1 [IU]/L
HBV SURFACE AG SERPL QL IA: NONREACTIVE
HCT VFR BLD AUTO: 42.3 %
HCV AB SERPL QL IA: NONREACTIVE
HDLC SERPL-MCNC: 55 MG/DL (ref 40–59)
HGB BLD-MCNC: 13.7 G/DL
IMM GRANULOCYTES # BLD AUTO: 0.01 X10(3) UL (ref 0–1)
IMM GRANULOCYTES NFR BLD: 0.2 %
IRON SATN MFR SERPL: 26 %
IRON SERPL-MCNC: 140 UG/DL
LDLC SERPL CALC-MCNC: 65 MG/DL (ref ?–100)
LYMPHOCYTES # BLD AUTO: 1.58 X10(3) UL (ref 1–4)
LYMPHOCYTES NFR BLD AUTO: 24.8 %
MCH RBC QN AUTO: 29.7 PG (ref 26–34)
MCHC RBC AUTO-ENTMCNC: 32.4 G/DL (ref 31–37)
MCV RBC AUTO: 91.8 FL
MONOCYTES # BLD AUTO: 0.33 X10(3) UL (ref 0.1–1)
MONOCYTES NFR BLD AUTO: 5.2 %
NEUTROPHILS # BLD AUTO: 4.29 X10 (3) UL (ref 1.5–7.7)
NEUTROPHILS # BLD AUTO: 4.29 X10(3) UL (ref 1.5–7.7)
NEUTROPHILS NFR BLD AUTO: 67.5 %
NONHDLC SERPL-MCNC: 81 MG/DL (ref ?–130)
OSMOLALITY SERPL CALC.SUM OF ELEC: 280 MOSM/KG (ref 275–295)
PLATELET # BLD AUTO: 345 10(3)UL (ref 150–450)
POTASSIUM SERPL-SCNC: 4.3 MMOL/L (ref 3.5–5.1)
PROT SERPL-MCNC: 8.1 G/DL (ref 6.4–8.2)
RBC # BLD AUTO: 4.61 X10(6)UL
SODIUM SERPL-SCNC: 136 MMOL/L (ref 136–145)
T4 FREE SERPL-MCNC: 1.2 NG/DL (ref 0.8–1.7)
TIBC SERPL-MCNC: 538 UG/DL (ref 240–450)
TRANSFERRIN SERPL-MCNC: 361 MG/DL (ref 200–360)
TRIGL SERPL-MCNC: 83 MG/DL (ref 30–149)
TSI SER-ACNC: 0.9 MIU/ML (ref 0.36–3.74)
VLDLC SERPL CALC-MCNC: 12 MG/DL (ref 0–30)
WBC # BLD AUTO: 6.4 X10(3) UL (ref 4–11)

## 2022-11-28 PROCEDURE — 87389 HIV-1 AG W/HIV-1&-2 AB AG IA: CPT

## 2022-11-28 PROCEDURE — 84443 ASSAY THYROID STIM HORMONE: CPT

## 2022-11-28 PROCEDURE — 85025 COMPLETE CBC W/AUTO DIFF WBC: CPT

## 2022-11-28 PROCEDURE — 86780 TREPONEMA PALLIDUM: CPT

## 2022-11-28 PROCEDURE — 80061 LIPID PANEL: CPT

## 2022-11-28 PROCEDURE — 86706 HEP B SURFACE ANTIBODY: CPT

## 2022-11-28 PROCEDURE — 80053 COMPREHEN METABOLIC PANEL: CPT

## 2022-11-28 PROCEDURE — 87480 CANDIDA DNA DIR PROBE: CPT | Performed by: NURSE PRACTITIONER

## 2022-11-28 PROCEDURE — 83540 ASSAY OF IRON: CPT

## 2022-11-28 PROCEDURE — 87591 N.GONORRHOEAE DNA AMP PROB: CPT | Performed by: NURSE PRACTITIONER

## 2022-11-28 PROCEDURE — 84466 ASSAY OF TRANSFERRIN: CPT

## 2022-11-28 PROCEDURE — 87340 HEPATITIS B SURFACE AG IA: CPT

## 2022-11-28 PROCEDURE — 87510 GARDNER VAG DNA DIR PROBE: CPT | Performed by: NURSE PRACTITIONER

## 2022-11-28 PROCEDURE — 86803 HEPATITIS C AB TEST: CPT

## 2022-11-28 PROCEDURE — 84702 CHORIONIC GONADOTROPIN TEST: CPT

## 2022-11-28 PROCEDURE — 87660 TRICHOMONAS VAGIN DIR PROBE: CPT | Performed by: NURSE PRACTITIONER

## 2022-11-28 PROCEDURE — 82728 ASSAY OF FERRITIN: CPT

## 2022-11-28 PROCEDURE — 84439 ASSAY OF FREE THYROXINE: CPT

## 2022-11-28 PROCEDURE — 87491 CHLMYD TRACH DNA AMP PROBE: CPT | Performed by: NURSE PRACTITIONER

## 2022-11-29 LAB
C TRACH DNA SPEC QL NAA+PROBE: NEGATIVE
N GONORRHOEA DNA SPEC QL NAA+PROBE: NEGATIVE

## 2022-11-30 LAB — T PALLIDUM AB SER QL: NEGATIVE

## 2022-12-01 RX ORDER — METRONIDAZOLE 7.5 MG/G
GEL VAGINAL
Qty: 1 EACH | Refills: 0 | Status: SHIPPED | OUTPATIENT
Start: 2022-12-01

## 2025-08-05 ENCOUNTER — TELEPHONE (OUTPATIENT)
Dept: FAMILY MEDICINE CLINIC | Facility: CLINIC | Age: 25
End: 2025-08-05

## (undated) DIAGNOSIS — N76.0 BACTERIAL VAGINOSIS: Primary | ICD-10-CM

## (undated) DIAGNOSIS — B96.89 BACTERIAL VAGINOSIS: Primary | ICD-10-CM

## (undated) DIAGNOSIS — B96.89 BACTERIAL VAGINOSIS: ICD-10-CM

## (undated) DIAGNOSIS — N76.0 BACTERIAL VAGINOSIS: ICD-10-CM

## (undated) NOTE — LETTER
06/01/20    To Whom It May Concern:    Mahin Guerrier may return to work without restrictions. Recommend mask and social distancing per CDC guidelines as is the recommendations for everyone currently.        Sincerely,        Ronit ENGEL-

## (undated) NOTE — LETTER
Date: 7/31/2020    Patient Name: Lottie Nageotte          To Whom it may concern: This letter has been written at the patient's request. The above patient was seen at the [facility] for treatment of a medical condition.     This patient

## (undated) NOTE — LETTER
Date & Time: 4/7/2021, 12:25 PM  Patient: Flory Horner  Encounter Provider(s):    MILLER Yates       To Whom It May Concern:    Shabana Pacheco was seen and treated in our department on 4/7/2021.  She should not return to work [place]

## (undated) NOTE — LETTER
Date & Time: 3/16/2021, 7:38 PM  Patient: Jennifer Reed  Encounter Provider(s):    MAREN Watson       To Whom It May Concern:    Le Barillas was seen and treated in our department on 3/16/2021.  She should not return to work until [employer]

## (undated) NOTE — ED AVS SNAPSHOT
Clementina Parmar   MRN: [id number]    Department:  [facility] Emergency Department   Date of Visit:  6/29/2018           Disclosure     Insurance plans vary and the physician(s) referred by the ER may not be covered by your plan.  Please contact tell this physician (or your personal doctor if your instructions are to return to your personal doctor) about any new or lasting problems. The primary care or specialist physician will see patients referred from the [facility] Emergency Department.  Nan Cano

## (undated) NOTE — LETTER
Date: 7/31/2020    Patient Name: Salbador Lesches          To Whom it may concern: This letter has been written at the patient's request. The above patient was seen at the [facility] for treatment of a medical condition.     This patient

## (undated) NOTE — LETTER
Date: 6/9/2020    Patient Name: Rachel Li          To Whom it may concern: This letter has been written at the patient's request. The above patient was seen at the [facility] for treatment of a medical condition.       The patient

## (undated) NOTE — LETTER
Date: 6/5/2020    Patient Name: Soo Bustamante          To Whom it may concern: This letter has been written at the patient's request. The above patient was seen at the [facility] for treatment of a medical condition.     This patient s

## (undated) NOTE — Clinical Note
Please advise Covid team to continue to monitor this patient. I wasn't able to pull up their pool to forward note to them.

## (undated) NOTE — ED AVS SNAPSHOT
Jomar Gunderson   MRN: [id number]    Department:  [facility] Emergency Department   Date of Visit:  5/6/2019           Disclosure     Insurance plans vary and the physician(s) referred by the ER may not be covered by your plan.  Please contact y tell this physician (or your personal doctor if your instructions are to return to your personal doctor) about any new or lasting problems. The primary care or specialist physician will see patients referred from the [facility] Emergency Department.  Sushant Gustafson

## (undated) NOTE — LETTER
Date: 12/18/2020    Patient Name: Soo Bustamante          To Whom it may concern: This letter has been written at the patient's request. The above patient was seen at the [facility] for treatment of a medical condition.     The patient